# Patient Record
Sex: MALE | Race: WHITE | Employment: PART TIME | URBAN - METROPOLITAN AREA
[De-identification: names, ages, dates, MRNs, and addresses within clinical notes are randomized per-mention and may not be internally consistent; named-entity substitution may affect disease eponyms.]

---

## 2018-05-04 ENCOUNTER — APPOINTMENT (OUTPATIENT)
Dept: CT IMAGING | Age: 23
DRG: 199 | End: 2018-05-04
Attending: FAMILY MEDICINE
Payer: SELF-PAY

## 2018-05-04 ENCOUNTER — APPOINTMENT (OUTPATIENT)
Dept: CT IMAGING | Age: 23
DRG: 199 | End: 2018-05-04
Attending: EMERGENCY MEDICINE
Payer: SELF-PAY

## 2018-05-04 ENCOUNTER — APPOINTMENT (OUTPATIENT)
Dept: GENERAL RADIOLOGY | Age: 23
DRG: 199 | End: 2018-05-04
Attending: PHYSICIAN ASSISTANT
Payer: SELF-PAY

## 2018-05-04 ENCOUNTER — HOSPITAL ENCOUNTER (INPATIENT)
Age: 23
LOS: 3 days | Discharge: HOME | DRG: 199 | End: 2018-05-07
Attending: EMERGENCY MEDICINE | Admitting: FAMILY MEDICINE
Payer: SELF-PAY

## 2018-05-04 DIAGNOSIS — D72.829 LEUKOCYTOSIS, UNSPECIFIED TYPE: ICD-10-CM

## 2018-05-04 DIAGNOSIS — R07.89 ATYPICAL CHEST PAIN: Primary | ICD-10-CM

## 2018-05-04 DIAGNOSIS — F19.10 DRUG ABUSE (HCC): ICD-10-CM

## 2018-05-04 DIAGNOSIS — J98.2 PNEUMOMEDIASTINUM (HCC): ICD-10-CM

## 2018-05-04 PROBLEM — R07.9 ACUTE CHEST PAIN: Status: ACTIVE | Noted: 2018-05-04

## 2018-05-04 PROBLEM — F14.90 CRACK COCAINE USE: Status: ACTIVE | Noted: 2018-05-04

## 2018-05-04 PROBLEM — R06.02 SHORTNESS OF BREATH: Status: ACTIVE | Noted: 2018-05-04

## 2018-05-04 LAB
ABO + RH BLD: NORMAL
ALBUMIN SERPL-MCNC: 3.7 G/DL (ref 3.5–5)
ALBUMIN/GLOB SERPL: 0.9 {RATIO} (ref 1.1–2.2)
ALP SERPL-CCNC: 58 U/L (ref 45–117)
ALT SERPL-CCNC: 15 U/L (ref 12–78)
AMPHET UR QL SCN: NEGATIVE
ANION GAP SERPL CALC-SCNC: 6 MMOL/L (ref 5–15)
APPEARANCE UR: CLEAR
APTT PPP: 28.3 SEC (ref 22.1–32)
ARTERIAL PATENCY WRIST A: YES
AST SERPL-CCNC: 16 U/L (ref 15–37)
BACTERIA URNS QL MICRO: NEGATIVE /HPF
BARBITURATES UR QL SCN: NEGATIVE
BASE DEFICIT BLD-SCNC: 1 MMOL/L
BASOPHILS # BLD: 0 K/UL (ref 0–0.1)
BASOPHILS NFR BLD: 0 % (ref 0–1)
BDY SITE: ABNORMAL
BENZODIAZ UR QL: NEGATIVE
BILIRUB SERPL-MCNC: 1.5 MG/DL (ref 0.2–1)
BILIRUB UR QL: NEGATIVE
BLOOD GROUP ANTIBODIES SERPL: NORMAL
BUN SERPL-MCNC: 17 MG/DL (ref 6–20)
BUN/CREAT SERPL: 15 (ref 12–20)
CALCIUM SERPL-MCNC: 9.2 MG/DL (ref 8.5–10.1)
CANNABINOIDS UR QL SCN: NEGATIVE
CHLORIDE SERPL-SCNC: 105 MMOL/L (ref 97–108)
CO2 SERPL-SCNC: 28 MMOL/L (ref 21–32)
COCAINE UR QL SCN: POSITIVE
COLOR UR: ABNORMAL
CREAT SERPL-MCNC: 1.15 MG/DL (ref 0.7–1.3)
D DIMER PPP FEU-MCNC: 0.34 MG/L FEU (ref 0–0.65)
DIFFERENTIAL METHOD BLD: ABNORMAL
DRUG SCRN COMMENT,DRGCM: ABNORMAL
EOSINOPHIL # BLD: 0.1 K/UL (ref 0–0.4)
EOSINOPHIL NFR BLD: 0 % (ref 0–7)
EPITH CASTS URNS QL MICRO: ABNORMAL /LPF
ERYTHROCYTE [DISTWIDTH] IN BLOOD BY AUTOMATED COUNT: 14.7 % (ref 11.5–14.5)
GAS FLOW.O2 O2 DELIVERY SYS: ABNORMAL L/MIN
GLOBULIN SER CALC-MCNC: 4.2 G/DL (ref 2–4)
GLUCOSE SERPL-MCNC: 100 MG/DL (ref 65–100)
GLUCOSE UR STRIP.AUTO-MCNC: NEGATIVE MG/DL
HCO3 BLD-SCNC: 23.6 MMOL/L (ref 22–26)
HCT VFR BLD AUTO: 46 % (ref 36.6–50.3)
HGB BLD-MCNC: 15.2 G/DL (ref 12.1–17)
HGB UR QL STRIP: NEGATIVE
HYALINE CASTS URNS QL MICRO: ABNORMAL /LPF (ref 0–5)
IMM GRANULOCYTES # BLD: 0.1 K/UL (ref 0–0.04)
IMM GRANULOCYTES NFR BLD AUTO: 1 % (ref 0–0.5)
INR PPP: 1.2 (ref 0.9–1.1)
KETONES UR QL STRIP.AUTO: ABNORMAL MG/DL
LACTATE SERPL-SCNC: 0.5 MMOL/L (ref 0.4–2)
LEUKOCYTE ESTERASE UR QL STRIP.AUTO: NEGATIVE
LYMPHOCYTES # BLD: 1.9 K/UL (ref 0.8–3.5)
LYMPHOCYTES NFR BLD: 11 % (ref 12–49)
MCH RBC QN AUTO: 31.8 PG (ref 26–34)
MCHC RBC AUTO-ENTMCNC: 33 G/DL (ref 30–36.5)
MCV RBC AUTO: 96.2 FL (ref 80–99)
METHADONE UR QL: NEGATIVE
MONOCYTES # BLD: 1.1 K/UL (ref 0–1)
MONOCYTES NFR BLD: 6 % (ref 5–13)
NEUTS SEG # BLD: 14.5 K/UL (ref 1.8–8)
NEUTS SEG NFR BLD: 82 % (ref 32–75)
NITRITE UR QL STRIP.AUTO: NEGATIVE
NRBC # BLD: 0 K/UL (ref 0–0.01)
NRBC BLD-RTO: 0 PER 100 WBC
OPIATES UR QL: NEGATIVE
PCO2 BLD: 38.9 MMHG (ref 35–45)
PCP UR QL: NEGATIVE
PH BLD: 7.39 [PH] (ref 7.35–7.45)
PH UR STRIP: 6 [PH] (ref 5–8)
PLATELET # BLD AUTO: 206 K/UL (ref 150–400)
PMV BLD AUTO: 8.8 FL (ref 8.9–12.9)
PO2 BLD: 55 MMHG (ref 80–100)
POTASSIUM SERPL-SCNC: 4.3 MMOL/L (ref 3.5–5.1)
PROT SERPL-MCNC: 7.9 G/DL (ref 6.4–8.2)
PROT UR STRIP-MCNC: NEGATIVE MG/DL
PROTHROMBIN TIME: 12.1 SEC (ref 9–11.1)
RBC # BLD AUTO: 4.78 M/UL (ref 4.1–5.7)
RBC #/AREA URNS HPF: ABNORMAL /HPF (ref 0–5)
SAO2 % BLD: 88 % (ref 92–97)
SODIUM SERPL-SCNC: 139 MMOL/L (ref 136–145)
SP GR UR REFRACTOMETRY: 1.01 (ref 1–1.03)
SPECIMEN EXP DATE BLD: NORMAL
SPECIMEN TYPE: ABNORMAL
THERAPEUTIC RANGE,PTTT: NORMAL SECS (ref 58–77)
TROPONIN I SERPL-MCNC: <0.04 NG/ML
UA: UC IF INDICATED,UAUC: ABNORMAL
UROBILINOGEN UR QL STRIP.AUTO: 1 EU/DL (ref 0.2–1)
WBC # BLD AUTO: 17.8 K/UL (ref 4.1–11.1)
WBC URNS QL MICRO: ABNORMAL /HPF (ref 0–4)

## 2018-05-04 PROCEDURE — 74011250636 HC RX REV CODE- 250/636: Performed by: NURSE PRACTITIONER

## 2018-05-04 PROCEDURE — 71275 CT ANGIOGRAPHY CHEST: CPT

## 2018-05-04 PROCEDURE — 80307 DRUG TEST PRSMV CHEM ANLYZR: CPT | Performed by: FAMILY MEDICINE

## 2018-05-04 PROCEDURE — 86900 BLOOD TYPING SEROLOGIC ABO: CPT | Performed by: FAMILY MEDICINE

## 2018-05-04 PROCEDURE — 83605 ASSAY OF LACTIC ACID: CPT | Performed by: FAMILY MEDICINE

## 2018-05-04 PROCEDURE — 94762 N-INVAS EAR/PLS OXIMTRY CONT: CPT

## 2018-05-04 PROCEDURE — 93005 ELECTROCARDIOGRAM TRACING: CPT

## 2018-05-04 PROCEDURE — 74011250636 HC RX REV CODE- 250/636: Performed by: FAMILY MEDICINE

## 2018-05-04 PROCEDURE — 65660000000 HC RM CCU STEPDOWN

## 2018-05-04 PROCEDURE — 80053 COMPREHEN METABOLIC PANEL: CPT | Performed by: PHYSICIAN ASSISTANT

## 2018-05-04 PROCEDURE — 81001 URINALYSIS AUTO W/SCOPE: CPT | Performed by: FAMILY MEDICINE

## 2018-05-04 PROCEDURE — 84484 ASSAY OF TROPONIN QUANT: CPT | Performed by: PHYSICIAN ASSISTANT

## 2018-05-04 PROCEDURE — 82803 BLOOD GASES ANY COMBINATION: CPT

## 2018-05-04 PROCEDURE — 96360 HYDRATION IV INFUSION INIT: CPT

## 2018-05-04 PROCEDURE — 74011636320 HC RX REV CODE- 636/320: Performed by: EMERGENCY MEDICINE

## 2018-05-04 PROCEDURE — 99284 EMERGENCY DEPT VISIT MOD MDM: CPT

## 2018-05-04 PROCEDURE — 71046 X-RAY EXAM CHEST 2 VIEWS: CPT

## 2018-05-04 PROCEDURE — 85379 FIBRIN DEGRADATION QUANT: CPT | Performed by: FAMILY MEDICINE

## 2018-05-04 PROCEDURE — 85025 COMPLETE CBC W/AUTO DIFF WBC: CPT | Performed by: PHYSICIAN ASSISTANT

## 2018-05-04 PROCEDURE — 85730 THROMBOPLASTIN TIME PARTIAL: CPT | Performed by: FAMILY MEDICINE

## 2018-05-04 PROCEDURE — 74011000258 HC RX REV CODE- 258: Performed by: EMERGENCY MEDICINE

## 2018-05-04 PROCEDURE — 87040 BLOOD CULTURE FOR BACTERIA: CPT | Performed by: FAMILY MEDICINE

## 2018-05-04 PROCEDURE — 36415 COLL VENOUS BLD VENIPUNCTURE: CPT | Performed by: FAMILY MEDICINE

## 2018-05-04 PROCEDURE — 36600 WITHDRAWAL OF ARTERIAL BLOOD: CPT

## 2018-05-04 PROCEDURE — 85610 PROTHROMBIN TIME: CPT | Performed by: FAMILY MEDICINE

## 2018-05-04 PROCEDURE — 74011000258 HC RX REV CODE- 258: Performed by: FAMILY MEDICINE

## 2018-05-04 RX ORDER — SODIUM CHLORIDE 0.9 % (FLUSH) 0.9 %
5-10 SYRINGE (ML) INJECTION EVERY 8 HOURS
Status: DISCONTINUED | OUTPATIENT
Start: 2018-05-05 | End: 2018-05-07 | Stop reason: HOSPADM

## 2018-05-04 RX ORDER — SODIUM CHLORIDE 0.9 % (FLUSH) 0.9 %
5-10 SYRINGE (ML) INJECTION AS NEEDED
Status: DISCONTINUED | OUTPATIENT
Start: 2018-05-04 | End: 2018-05-07 | Stop reason: HOSPADM

## 2018-05-04 RX ORDER — SODIUM CHLORIDE 9 MG/ML
125 INJECTION, SOLUTION INTRAVENOUS CONTINUOUS
Status: DISCONTINUED | OUTPATIENT
Start: 2018-05-05 | End: 2018-05-07 | Stop reason: HOSPADM

## 2018-05-04 RX ORDER — LEVOFLOXACIN 5 MG/ML
750 INJECTION, SOLUTION INTRAVENOUS EVERY 24 HOURS
Status: DISCONTINUED | OUTPATIENT
Start: 2018-05-04 | End: 2018-05-07 | Stop reason: HOSPADM

## 2018-05-04 RX ORDER — SODIUM CHLORIDE 0.9 % (FLUSH) 0.9 %
10 SYRINGE (ML) INJECTION
Status: COMPLETED | OUTPATIENT
Start: 2018-05-04 | End: 2018-05-04

## 2018-05-04 RX ADMIN — SODIUM CHLORIDE 500 ML: 900 INJECTION, SOLUTION INTRAVENOUS at 19:10

## 2018-05-04 RX ADMIN — SODIUM CHLORIDE 100 ML: 900 INJECTION, SOLUTION INTRAVENOUS at 21:13

## 2018-05-04 RX ADMIN — SODIUM CHLORIDE 1000 ML: 900 INJECTION, SOLUTION INTRAVENOUS at 21:47

## 2018-05-04 RX ADMIN — SODIUM CHLORIDE 125 ML/HR: 900 INJECTION, SOLUTION INTRAVENOUS at 23:28

## 2018-05-04 RX ADMIN — Medication 10 ML: at 21:12

## 2018-05-04 RX ADMIN — Medication 10 ML: at 23:30

## 2018-05-04 RX ADMIN — LEVOFLOXACIN 750 MG: 5 INJECTION, SOLUTION INTRAVENOUS at 23:29

## 2018-05-04 RX ADMIN — IOPAMIDOL 100 ML: 755 INJECTION, SOLUTION INTRAVENOUS at 21:12

## 2018-05-04 NOTE — IP AVS SNAPSHOT
[facility] 
[phone] Patient: Isidro Floor MRN: [id number] VTV:2/63/6533 About your hospitalization You were admitted on: May 4, 2018 You last received care in the:  [address] [address] [facility] You were discharged on: May 7, 2018 Why you were hospitalized Your primary diagnosis was:  Pneumomediastinum (Hcc) Your diagnoses also included:  Shortness Of Breath, Crack Cocaine Use, Acute Chest Pain Follow-up Information Follow up With Details Comments Contact Info 403 [facility] In 1 week  222 [facility] [address] 
[phone] [address] [place] [address] EMERGENCY DEP  If symptoms worsen 611 [facility] [address] [phone] Jayde Other, MD  pcp of choice as needed  Patient can only remember the practice name and not the physician Discharge Orders None A check adriano indicates which time of day the medication should be taken. My Medications START taking these medications Instructions Each Dose to Equal  
 Morning Noon Evening Bedtime  
 levoFLOXacin 750 mg tablet Commonly known as:  Yue Lieu Your last dose was: Your next dose is: Take 1 Tab by mouth daily. 750 mg Where to Get Your Medications Information on where to get these meds will be given to you by the nurse or doctor. ! Ask your nurse or doctor about these medications  
  levoFLOXacin 750 mg tablet Discharge Instructions Cocaine Misuse: Care Instructions Your Care Instructions Using cocaine can cause physical and mental harm. It can increase your heart rate and blood pressure, which can lead to a heart attack and even death. It can raise your body temperature. You may have nausea, vomiting, and chills. If you smoke cocaine, the fumes can cause breathing problems. If you snort cocaine, it can damage your nasal passages. If you inject cocaine, it can cause an abscess at the injection site or an infection throughout your body. You may become shaky and restless. You also may see or hear things that are not there (hallucinations), or believe things that are not true. When the doctor treated you, he or she may have: · Watched your symptoms or done tests to find out how much cocaine was in your body. · Treated you to control your heart rate, temperature, and blood pressure. · Given you oxygen to help you breathe. · Given you medicine to settle your thoughts and help keep you calm. The doctor has checked you carefully, but problems can develop later. If you notice any problems or new symptoms, get medical treatment right away. How can you care for yourself at home? · When you use cocaine regularly, your body and brain get used to it. This is called dependency. If you are dependent on this drug, you may have withdrawal symptoms when you stop using it. You may feel drowsy, have vivid dreams, or feel hungry, tired, or depressed. You may also feel confused and have trouble thinking clearly. To help get past these symptoms: ¨ Get plenty of rest. 
¨ Drink lots of fluids. ¨ Stay active, but don't tire yourself. ¨ Eat a healthy diet. · Talk to your doctor about drug counseling programs that can help you stop using cocaine. · When you stop using cocaine, you may develop abstinence syndrome, which can last for months. Symptoms of this may include feeling depressed, being tired, having trouble concentrating, and craving cocaine. When should you call for help? Call 911 anytime you think you may need emergency care. For example, call if: 
? · You have symptoms of a heart attack. These may include: ¨ Chest pain or pressure, or a strange feeling in the chest. 
¨ Sweating. ¨ Shortness of breath. ¨ Nausea or vomiting. ¨ Pain, pressure, or a strange feeling in the back, neck, jaw, or upper belly or in one or both shoulders or arms. ¨ A fast or irregular heartbeat. After you call 911, the  may tell you to chew 1 adult-strength or 2 to 4 low-dose aspirin. Wait for an ambulance. Do not try to drive yourself. ? · You feel you cannot stop from hurting yourself or someone else. ?Call your doctor now or seek immediate medical care if: 
? · You have severe side effects from using cocaine. These may include problems with thinking, such as seeing things that aren't there or thinking that someone is trying to harm you (paranoia). ? · You have new or worse withdrawal symptoms. ? Watch closely for changes in your health, and be sure to contact your doctor if: 
? · You need more help or support to stop. Where can you learn more? Go to http://paulMoneyReefjigna.info/. Enter N083 in the search box to learn more about \"Cocaine Misuse: Care Instructions. \" Current as of: November 3, 2016 Content Version: 11.4 © 9823-8661 [employer]. Care instructions adapted under license by [employer] (which disclaims liability or warranty for this information). If you have questions about a medical condition or this instruction, always ask your healthcare professional. [place] [address] any warranty or liability for your use of this information. Discharge Instructions PATIENT ID: Pascual Vallecillo MRN: [id number] YOB: 1995 DATE OF ADMISSION: 5/4/2018  6:32 PM   
DATE OF DISCHARGE: 5/7/2018 PRIMARY CARE PROVIDER: Jayde So MD  
 
ATTENDING PHYSICIAN: Matthias Butler MD 
DISCHARGING PROVIDER: Matthias Butler MD   
To contact this individual call [phone] and ask the  to page. If unavailable ask to be transferred the Adult Hospitalist Department. DISCHARGE DIAGNOSES pneumomediastinum 2n2 barotrauma 2n2 cocain use CONSULTATIONS: IP CONSULT TO HOSPITALIST 
IP CONSULT TO THORACIC SURGERY 
 
PROCEDURES/SURGERIES: * No surgery found * PENDING TEST RESULTS:  
At the time of discharge the following test results are still pending: na 
 
FOLLOW UP APPOINTMENTS:  
Follow-up Information Follow up With Details Comments Contact Info [address] In 1 week  222 Bhumika Gomez [address] [phone] [facility] EMERGENCY DEP  If symptoms worsen [address] [place] [facility] [address] 
[phone] Jayde So MD  pcp of choice as needed  Patient can only remember the practice name and not the physician ADDITIONAL CARE RECOMMENDATIONS: na 
 
DIET: Resume previous diet ACTIVITY: Activity as tolerated WOUND CARE: na 
 
EQUIPMENT needed: na 
 
 
  
 SNF/Inpatient Rehab/LTAC  
x Independent/assisted living Hospice Other:  
 
   
 
Signed:  
Lynda Root MD 
5/7/2018 
8:16 AM 
 
 
  
  
  
Homesnap Announcement We are excited to announce that we are making your provider's discharge notes available to you in Homesnap.   You will see these notes when they are completed and signed by the physician that discharged you from your recent hospital stay. If you have any questions or concerns about any information you see in Healthkart, please call the Health Information Department where you were seen or reach out to your Primary Care Provider for more information about your plan of care. Introducing hospitals & HEALTH SERVICES! [facility] introduces Healthkart patient portal. Now you can access parts of your medical record, email your doctor's office, and request medication refills online. 1. In your internet browser, go to [email or web address] 2. Click on the First Time User? Click Here link in the Sign In box. You will see the New Member Sign Up page. 3. Enter your Healthkart Access Code exactly as it appears below. You will not need to use this code after youve completed the sign-up process. If you do not sign up before the expiration date, you must request a new code. · Healthkart Access Code: [email or web address] Expires: 8/2/2018  6:12 PM 
 
4. Enter the last four digits of your Social Security Number (xxxx) and Date of Birth (mm/dd/yyyy) as indicated and click Submit. You will be taken to the next sign-up page. 5. Create a Healthkart ID. This will be your Healthkart login ID and cannot be changed, so think of one that is secure and easy to remember. 6. Create a Healthkart password. You can change your password at any time. 7. Enter your Password Reset Question and Answer. This can be used at a later time if you forget your password. 8. Enter your e-mail address. You will receive e-mail notification when new information is available in [address] 9. Click Sign Up. You can now view and download portions of your medical record. 10. Click the Download Summary menu link to download a portable copy of your medical information. If you have questions, please visit the Frequently Asked Questions section of the Healthkart website. Remember, Healthkart is NOT to be used for urgent needs. For medical emergencies, dial 911. Now available from your iPhone and Android! Introducing Shiv Miles As a [facility] patient, I wanted to make you aware of our electronic visit tool called Shiv Miles. CardStar 24/7 allows you to connect within minutes with a medical provider 24 hours a day, seven days a week via a mobile device or tablet or logging into a secure website from your computer. You can access Shiv Miles from anywhere in the [place]. A virtual visit might be right for you when you have a simple condition and feel like you just dont want to get out of bed, or cant get away from work for an appointment, when your regular [facility] provider is not available (evenings, weekends or holidays), or when youre out of town and need minor care. Electronic visits cost only $49 and if the Quosis/7 provider determines a prescription is needed to treat your condition, one can be electronically transmitted to a nearby pharmacy*. Please take a moment to enroll today if you have not already done so. The enrollment process is free and takes just a few minutes. To enroll, please download the CardStar 24/Secrette kamari to your tablet or phone, or visit [email or web address] to enroll on your computer. And, as an [address] [place] [address] patient with a [email or web address] account, the results of your visits will be scanned into your electronic medical record and your primary care provider will be able to view the scanned results. We urge you to continue to see your regular [facility] provider for your ongoing medical care. And while your primary care provider may not be the one available when you seek a Shiv Miles virtual visit, the peace of mind you get from getting a real diagnosis real time can be priceless. For more information on Shiv Miles, view our Frequently Asked Questions (FAQs) at [email or web address]. Sincerely, 
 
Deepak Harvey MD 
Chief Medical Officer [facility] Lillie Simmons *:  certain medications cannot be prescribed via Shiv Miles Unresulted Labs-Please follow up with your PCP about these lab tests Order Current Status CULTURE, BLOOD Preliminary result CULTURE, BLOOD Preliminary result Providers Seen During Your Hospitalization Provider Specialty Primary office phone Lucia Long MD Emergency Medicine [phone] Kami Carmona MD Family Practice [phone] Hamlet Cornell MD Internal Medicine [phone] Your Primary Care Physician (PCP) Primary Care Physician Office Phone Office Fax OTHER, PHYS ** None ** ** None ** You are allergic to the following No active allergies Recent Documentation Height Weight BMI Smoking Status 1.803 m 70.3 kg 21.62 kg/m2 Current Every Day Smoker Emergency Contacts Name Discharge Info Relation Home Work Mobile Krishan Peguero YES [1] Father [15] [phone] Patient Belongings The following personal items are in your possession at time of discharge: 
  Dental Appliances: None  Visual Aid: None      Home Medications: None   Jewelry: None  Clothing: Pants, Socks, Shirt    Other Valuables: None Please provide this summary of care documentation to your next provider. Signatures-by signing, you are acknowledging that this After Visit Summary has been reviewed with you and you have received a copy. Patient Signature:  ____________________________________________________________ Date:  ____________________________________________________________  
  
[place] Provider Signature:  ____________________________________________________________ Date:  ____________________________________________________________

## 2018-05-04 NOTE — ED NOTES
6:11 PM  I have evaluated the patient as the Provider in Triage. I have reviewed His vital signs and the triage nurse assessment. I have talked with the patient and any available family and advised that I am the provider in triage and have ordered the appropriate study to initiate their work up based on the clinical presentation during my assessment. I have advised that the patient will be accommodated in the Main ED as soon as possible. I have also requested to contact the triage nurse or myself immediately if the patient experiences any changes in their condition during this brief waiting period. Smoked crack cocaine last night. CP and SOB today.     PREMA Schwartz

## 2018-05-04 NOTE — IP AVS SNAPSHOT
Summary of Care Report The Summary of Care report has been created to help improve care coordination. Users with access to Popular Pays or [address] (Web-based application) may access additional patient information including the Discharge Summary. If you are not currently a [address] user and need more information, please call the number listed below in the [place] [address] section and ask to be connected with Medical Records. Facility Information Name Address Phone [address] [place] [phone] Patient Information Patient Name Sex JUVENCIO Land ([id number]) Male 1995 Discharge Information Admitting Provider Service Area Unit Leonora Fregoso MD / [address] [place] [phone] Discharge Provider Discharge Date/Time Discharge Disposition Destination (none) 2018 (Pending) AHR (none) Patient Language Language ENGLISH [13] Hospital Problems as of 2018  Reviewed: 2018  8:12 AM by Nicolle Stanton MD  
 None Non-Hospital Problems as of 2018  Reviewed: 2018  8:12 AM by Nicolle Stanton MD  
 None You are allergic to the following No active allergies Current Discharge Medication List  
  
START taking these medications Dose & Instructions Dispensing Information Comments  
 levoFLOXacin 750 mg tablet Commonly known as:  Maliha Hernandez Dose:  750 mg Take 1 Tab by mouth daily. Quantity:  3 Tab Refills:  0 Follow-up Information Follow up With Details Comments Contact Info [address] [place] [address] In 1 week  222 [facility] [address] [phone] [facility] EMERGENCY DEP  If symptoms worsen 611 [facility] [address] [phone] Phys Other, MD  pcp of choice as needed  Patient can only remember the practice name and not the physician Discharge Instructions Cocaine Misuse: Care Instructions Your Care Instructions Using cocaine can cause physical and mental harm. It can increase your heart rate and blood pressure, which can lead to a heart attack and even death. It can raise your body temperature. You may have nausea, vomiting, and chills. If you smoke cocaine, the fumes can cause breathing problems. If you snort cocaine, it can damage your nasal passages. If you inject cocaine, it can cause an abscess at the injection site or an infection throughout your body. You may become shaky and restless. You also may see or hear things that are not there (hallucinations), or believe things that are not true. When the doctor treated you, he or she may have: · Watched your symptoms or done tests to find out how much cocaine was in your body. · Treated you to control your heart rate, temperature, and blood pressure. · Given you oxygen to help you breathe. · Given you medicine to settle your thoughts and help keep you calm. The doctor has checked you carefully, but problems can develop later. If you notice any problems or new symptoms, get medical treatment right away. How can you care for yourself at home? · When you use cocaine regularly, your body and brain get used to it. This is called dependency. If you are dependent on this drug, you may have withdrawal symptoms when you stop using it. You may feel drowsy, have vivid dreams, or feel hungry, tired, or depressed. You may also feel confused and have trouble thinking clearly. To help get past these symptoms: ¨ Get plenty of rest. 
¨ Drink lots of fluids. ¨ Stay active, but don't tire yourself. ¨ Eat a healthy diet. · Talk to your doctor about drug counseling programs that can help you stop using cocaine. · When you stop using cocaine, you may develop abstinence syndrome, which can last for months. Symptoms of this may include feeling depressed, being tired, having trouble concentrating, and craving cocaine. When should you call for help? Call 911 anytime you think you may need emergency care. For example, call if: 
? · You have symptoms of a heart attack. These may include: ¨ Chest pain or pressure, or a strange feeling in the chest. 
¨ Sweating. ¨ Shortness of breath. ¨ Nausea or vomiting. ¨ Pain, pressure, or a strange feeling in the back, neck, jaw, or upper belly or in one or both shoulders or arms. ¨ A fast or irregular heartbeat. After you call 911, the  may tell you to chew 1 adult-strength or 2 to 4 low-dose aspirin. Wait for an ambulance. Do not try to drive yourself. ? · You feel you cannot stop from hurting yourself or someone else. ?Call your doctor now or seek immediate medical care if: 
? · You have severe side effects from using cocaine. These may include problems with thinking, such as seeing things that aren't there or thinking that someone is trying to harm you (paranoia). ? · You have new or worse withdrawal symptoms. ? Watch closely for changes in your health, and be sure to contact your doctor if: 
? · You need more help or support to stop. Where can you learn more? Go to http://paul-jigna.info/. Enter F134 in the search box to learn more about \"Cocaine Misuse: Care Instructions. \" Current as of: November 3, 2016 Content Version: 11.4 © 4441-4547 [employer]. Care instructions adapted under license by t[employer]ap (which disclaims liability or warranty for this information). If you have questions about a medical condition or this instruction, always ask your healthcare professional. [address] any warranty or liability for your use of this information. Discharge Instructions PATIENT ID: Srinivas Carmona MRN: [id number] YOB: 1995 DATE OF ADMISSION: 5/4/2018  6:32 PM   
DATE OF DISCHARGE: 5/7/2018 PRIMARY CARE PROVIDER: Jayde So MD  
 
ATTENDING PHYSICIAN: Judy Vargas MD 
DISCHARGING PROVIDER: Judy Vargas MD   
To contact this individual call [phone] and ask the  to page. If unavailable ask to be transferred the Adult Hospitalist Department. DISCHARGE DIAGNOSES pneumomediastinum 2n2 barotrauma 2n2 cocain use CONSULTATIONS: IP CONSULT TO HOSPITALIST 
IP CONSULT TO THORACIC SURGERY 
 
PROCEDURES/SURGERIES: * No surgery found * PENDING TEST RESULTS:  
At the time of discharge the following test results are still pending: na 
 
FOLLOW UP APPOINTMENTS:  
Follow-up Information Follow up With Details Comments Contact Info 403 [facility] In 1 week  222 [facility] [address] [phone] [facility] EMERGENCY DEP  If symptoms worsen 611 [facility] [address] 
[phone] Jayde So MD  pcp of choice as needed  Patient can only remember the practice name and not the physician ADDITIONAL CARE RECOMMENDATIONS: na 
 
DIET: Resume previous diet ACTIVITY: Activity as tolerated WOUND CARE: na 
 
EQUIPMENT needed: na 
 
 
  
 SNF/Inpatient Rehab/LTAC  
x Independent/assisted living Hospice Other:  
 
   
 
Signed:  
Gretchen Nance MD 
5/7/2018 
8:16 AM 
 
 
Chart Review Routing History No Routing History on File

## 2018-05-04 NOTE — IP AVS SNAPSHOT
110 [facility] [place] [address] [place] [address] 
[phone] Patient: Walker Wells MRN: [id number] QTC:3/47/5882 A check adriano indicates which time of day the medication should be taken. My Medications START taking these medications Instructions Each Dose to Equal  
 Morning Noon Evening Bedtime  
 levoFLOXacin 750 mg tablet Commonly known as:  Lexie Barahona Your last dose was: Your next dose is: Take 1 Tab by mouth daily. 750 mg Where to Get Your Medications Information on where to get these meds will be given to you by the nurse or doctor. ! Ask your nurse or doctor about these medications  
  levoFLOXacin 750 mg tablet

## 2018-05-04 NOTE — ED TRIAGE NOTES
CP and SOB onset after smoking crack cocaine last night. Pain worse with movement. Pt reports pain with deep breath and \"I've been sweating\".

## 2018-05-05 LAB
ALBUMIN SERPL-MCNC: 3.2 G/DL (ref 3.5–5)
ALBUMIN/GLOB SERPL: 0.8 {RATIO} (ref 1.1–2.2)
ALP SERPL-CCNC: 50 U/L (ref 45–117)
ALT SERPL-CCNC: 13 U/L (ref 12–78)
ANION GAP SERPL CALC-SCNC: 7 MMOL/L (ref 5–15)
AST SERPL-CCNC: 15 U/L (ref 15–37)
ATRIAL RATE: 100 BPM
BASOPHILS # BLD: 0 K/UL (ref 0–0.1)
BASOPHILS NFR BLD: 0 % (ref 0–1)
BILIRUB DIRECT SERPL-MCNC: 0.3 MG/DL (ref 0–0.2)
BILIRUB SERPL-MCNC: 1.2 MG/DL (ref 0.2–1)
BUN SERPL-MCNC: 16 MG/DL (ref 6–20)
BUN/CREAT SERPL: 17 (ref 12–20)
CALCIUM SERPL-MCNC: 8.8 MG/DL (ref 8.5–10.1)
CALCULATED P AXIS, ECG09: 73 DEGREES
CALCULATED R AXIS, ECG10: 61 DEGREES
CALCULATED T AXIS, ECG11: 22 DEGREES
CHLORIDE SERPL-SCNC: 106 MMOL/L (ref 97–108)
CHOLEST SERPL-MCNC: 119 MG/DL
CO2 SERPL-SCNC: 26 MMOL/L (ref 21–32)
CREAT SERPL-MCNC: 0.94 MG/DL (ref 0.7–1.3)
DIAGNOSIS, 93000: NORMAL
DIFFERENTIAL METHOD BLD: ABNORMAL
EOSINOPHIL # BLD: 0.1 K/UL (ref 0–0.4)
EOSINOPHIL NFR BLD: 1 % (ref 0–7)
ERYTHROCYTE [DISTWIDTH] IN BLOOD BY AUTOMATED COUNT: 14.6 % (ref 11.5–14.5)
GLOBULIN SER CALC-MCNC: 3.8 G/DL (ref 2–4)
GLUCOSE SERPL-MCNC: 95 MG/DL (ref 65–100)
HCT VFR BLD AUTO: 42.5 % (ref 36.6–50.3)
HDLC SERPL-MCNC: 73 MG/DL
HDLC SERPL: 1.6 {RATIO} (ref 0–5)
HGB BLD-MCNC: 13.7 G/DL (ref 12.1–17)
IMM GRANULOCYTES # BLD: 0.1 K/UL (ref 0–0.04)
IMM GRANULOCYTES NFR BLD AUTO: 0 % (ref 0–0.5)
LACTATE SERPL-SCNC: 0.8 MMOL/L (ref 0.4–2)
LDLC SERPL CALC-MCNC: 32 MG/DL (ref 0–100)
LIPID PROFILE,FLP: NORMAL
LYMPHOCYTES # BLD: 2.1 K/UL (ref 0.8–3.5)
LYMPHOCYTES NFR BLD: 15 % (ref 12–49)
MCH RBC QN AUTO: 31.1 PG (ref 26–34)
MCHC RBC AUTO-ENTMCNC: 32.2 G/DL (ref 30–36.5)
MCV RBC AUTO: 96.6 FL (ref 80–99)
MONOCYTES # BLD: 0.9 K/UL (ref 0–1)
MONOCYTES NFR BLD: 7 % (ref 5–13)
NEUTS SEG # BLD: 10.6 K/UL (ref 1.8–8)
NEUTS SEG NFR BLD: 77 % (ref 32–75)
NRBC # BLD: 0 K/UL (ref 0–0.01)
NRBC BLD-RTO: 0 PER 100 WBC
P-R INTERVAL, ECG05: 126 MS
PLATELET # BLD AUTO: 194 K/UL (ref 150–400)
PMV BLD AUTO: 9.2 FL (ref 8.9–12.9)
POTASSIUM SERPL-SCNC: 3.9 MMOL/L (ref 3.5–5.1)
PROT SERPL-MCNC: 7 G/DL (ref 6.4–8.2)
Q-T INTERVAL, ECG07: 334 MS
QRS DURATION, ECG06: 88 MS
QTC CALCULATION (BEZET), ECG08: 430 MS
RBC # BLD AUTO: 4.4 M/UL (ref 4.1–5.7)
SODIUM SERPL-SCNC: 139 MMOL/L (ref 136–145)
TRIGL SERPL-MCNC: 70 MG/DL (ref ?–150)
VENTRICULAR RATE, ECG03: 100 BPM
VLDLC SERPL CALC-MCNC: 14 MG/DL
WBC # BLD AUTO: 13.9 K/UL (ref 4.1–11.1)

## 2018-05-05 PROCEDURE — 74011250637 HC RX REV CODE- 250/637: Performed by: FAMILY MEDICINE

## 2018-05-05 PROCEDURE — 80053 COMPREHEN METABOLIC PANEL: CPT | Performed by: FAMILY MEDICINE

## 2018-05-05 PROCEDURE — 74011000258 HC RX REV CODE- 258: Performed by: FAMILY MEDICINE

## 2018-05-05 PROCEDURE — 36415 COLL VENOUS BLD VENIPUNCTURE: CPT | Performed by: FAMILY MEDICINE

## 2018-05-05 PROCEDURE — 82248 BILIRUBIN DIRECT: CPT | Performed by: FAMILY MEDICINE

## 2018-05-05 PROCEDURE — 80061 LIPID PANEL: CPT | Performed by: FAMILY MEDICINE

## 2018-05-05 PROCEDURE — 85025 COMPLETE CBC W/AUTO DIFF WBC: CPT | Performed by: FAMILY MEDICINE

## 2018-05-05 PROCEDURE — 83605 ASSAY OF LACTIC ACID: CPT | Performed by: FAMILY MEDICINE

## 2018-05-05 PROCEDURE — 74011250636 HC RX REV CODE- 250/636: Performed by: FAMILY MEDICINE

## 2018-05-05 PROCEDURE — 65660000000 HC RM CCU STEPDOWN

## 2018-05-05 RX ORDER — ACETAMINOPHEN 325 MG/1
650 TABLET ORAL
Status: DISCONTINUED | OUTPATIENT
Start: 2018-05-05 | End: 2018-05-07 | Stop reason: HOSPADM

## 2018-05-05 RX ORDER — IBUPROFEN 200 MG
1 TABLET ORAL DAILY
Status: DISCONTINUED | OUTPATIENT
Start: 2018-05-05 | End: 2018-05-05

## 2018-05-05 RX ORDER — VANCOMYCIN 1.75 GRAM/500 ML IN 0.9 % SODIUM CHLORIDE INTRAVENOUS
1750 ONCE
Status: COMPLETED | OUTPATIENT
Start: 2018-05-05 | End: 2018-05-05

## 2018-05-05 RX ADMIN — LEVOFLOXACIN 750 MG: 5 INJECTION, SOLUTION INTRAVENOUS at 21:24

## 2018-05-05 RX ADMIN — VANCOMYCIN HYDROCHLORIDE 1000 MG: 1 INJECTION, POWDER, LYOPHILIZED, FOR SOLUTION INTRAVENOUS at 21:23

## 2018-05-05 RX ADMIN — VANCOMYCIN HYDROCHLORIDE 1750 MG: 10 INJECTION, POWDER, LYOPHILIZED, FOR SOLUTION INTRAVENOUS at 03:44

## 2018-05-05 RX ADMIN — PIPERACILLIN AND TAZOBACTAM 3.38 G: 3; .375 INJECTION, POWDER, FOR SOLUTION INTRAVENOUS at 09:32

## 2018-05-05 RX ADMIN — VANCOMYCIN HYDROCHLORIDE 750 MG: 750 INJECTION, POWDER, LYOPHILIZED, FOR SOLUTION INTRAVENOUS at 13:23

## 2018-05-05 RX ADMIN — Medication 10 ML: at 21:24

## 2018-05-05 RX ADMIN — Medication 10 ML: at 13:23

## 2018-05-05 RX ADMIN — PIPERACILLIN AND TAZOBACTAM 3.38 G: 3; .375 INJECTION, POWDER, FOR SOLUTION INTRAVENOUS at 01:21

## 2018-05-05 RX ADMIN — PIPERACILLIN AND TAZOBACTAM 3.38 G: 3; .375 INJECTION, POWDER, FOR SOLUTION INTRAVENOUS at 18:49

## 2018-05-05 RX ADMIN — ACETAMINOPHEN 650 MG: 325 TABLET, FILM COATED ORAL at 04:42

## 2018-05-05 NOTE — ED NOTES
TRANSFER - OUT REPORT:    Verbal report given to [address], RN(name) on Bonifacio Wall  being transferred to [facility] (unit) for routine progression of care       Report consisted of patients Situation, Background, Assessment and   Recommendations(SBAR). Information from the following report(s) SBAR, Kardex, ED Summary, Intake/Output, MAR, Recent Results and Cardiac Rhythm NSR was reviewed with the receiving nurse. Lines:   Peripheral IV 05/04/18 Right Antecubital (Active)   Site Assessment Clean, dry, & intact 5/4/2018  6:30 PM   Phlebitis Assessment 0 5/4/2018  6:30 PM   Dressing Status Clean, dry, & intact 5/4/2018  6:30 PM   Dressing Type Tape;Transparent 5/4/2018  6:30 PM   Hub Color/Line Status Pink;Capped;Flushed;Patent 5/4/2018  6:30 PM   Action Taken Blood drawn 5/4/2018  6:30 PM        Opportunity for questions and clarification was provided.

## 2018-05-05 NOTE — PROGRESS NOTES
Day #1 of Vancomycin  Indication:  SIRS  Current regimen:  vancomycin 750mg IV every 8  hours  Abx regimen:  vanc + levofloxacin + zosyn  ID Following ?: NO  Concomitant nephrotoxic drugs (requires more frequent monitoring): Contrast agents (received  @ ). Frequency of BMP?: daily (thru )    Recent Labs      18   0400  18   1830   WBC  13.9*  17.8*   CREA  0.94  1.15   BUN  16  17     Est CrCl: >120 ml/min; UO: --- ml/kg/hr  Temp (24hrs), Av.3 °F (36.8 °C), Min:97.2 °F (36.2 °C), Max:99.1 °F (37.3 °C)    Cultures:    - blood x 2 - NGTD - prelim   - MRSA screen - pending    Goal trough = 15 - 20 mcg/mL    Recent trough history (date/time/level/dose/action taken):  N/A    Plan: Will empirically Change to vancomycin 1000mg every 8 hours.

## 2018-05-05 NOTE — ROUTINE PROCESS
TRANSFER - IN REPORT:    Verbal report received from [address] [place] [address] (name) on Bonifacio [place]  being received from ED(unit) for routine progression of care      Report consisted of patients Situation, Background, Assessment and   Recommendations(SBAR). Information from the following report(s) SBAR, Recent Results and Cardiac Rhythm NSR was reviewed with the receiving nurse. Opportunity for questions and clarification was provided. Assessment completed upon patients arrival to unit and care assumed.          Primary Nurse Amisha Vasquez RN and Vinod Camejo RN performed a dual skin assessment on this patient No impairment noted  Wally score is 22

## 2018-05-05 NOTE — PROGRESS NOTES
Hospitalist Progress Note  Roberta Morgan MD  Answering service: [phone] from in house phone         Date of Service:  2018  NAME:  Crystal Ruth  :  1995  MRN:  [id number]      Admission Summary:   HISTORY OF PRESENT ILLNESS:  This is a 42-year-old white male with past medical history of crack cocaine abuse, presented to the Emergency Department from home with chief complaint of chest pain and shortness of breath. Patient notes he had smoked crack cocaine all last night and he had acute onset of chest pain located in the substernal region, severe, constant, aggravated with deep breathing, without specific alleviating factors. Dull aching. According to ER records, patient had a sense of impending doom while smoking crack cocaine. He had reportedly felt like he was going to die. At that time the patient had not sought any medical attention. Subsequently continued to have chest pain along with shortness of breath and palpitations. Finally came to the Emergency Department 2018 to the ED for evaluation. Interval history / Subjective:       2018   This note is a follow up note for multiple medical issues as listed below and partially expanded on herewith. Had pain on swallowing yesterday pm not this am with his liquid meals, will cont liquids and advacne in am if no contraindicaton at this time. See CT surg note on this. Assessment & Plan:     Pneumomediastinum; improved subjectively, see CT surg note, pt was using recreational drugs ( cocain) which known to cause this condition. Pt is better advacning diet in am  Anticipated    Tobacco abuse, discussed,     cocain abuse , discussed.     Code status: full   DVT prophylaxis: SCD;s     Care Plan discussed with: Patient/Family and Nurse  Disposition: Home w/Family and TBD     Hospital Problems  Date Reviewed: 2018          Codes Class Noted POA Shortness of breath ICD-10-CM: R06.02  ICD-9-CM: 786.05  5/4/2018 Unknown        * (Principal)Pneumomediastinum (HCC) ICD-10-CM: J98.2  ICD-9-CM: 518.1  5/4/2018 Unknown        Crack cocaine use ICD-10-CM: F14.90  ICD-9-CM: 305.60  5/4/2018 Unknown        Acute chest pain ICD-10-CM: R07.9  ICD-9-CM: 786.50  5/4/2018 Unknown                Review of Systems:   A comprehensive review of systems was negative except for that written in the HPI. Cp better, no n/v, dysphagia better. Vital Signs:    Last 24hrs VS reviewed since prior progress note. Most recent are:  Visit Vitals    /73 (BP 1 Location: Left arm, BP Patient Position: At rest)    Pulse 91    Temp 97.2 °F (36.2 °C)    Resp 19    Ht 5' 11\" (1.803 m)    Wt 70.1 kg (154 lb 8.7 oz)    SpO2 90%    BMI 21.55 kg/m2         Intake/Output Summary (Last 24 hours) at 05/05/18 1243  Last data filed at 05/05/18 0859   Gross per 24 hour   Intake           716.67 ml   Output              875 ml   Net          -158.33 ml        Physical Examination:             Constitutional:  No acute distress, cooperative, pleasant    ENT:  Oral mucous moist, oropharynx benign. Neck supple,    Resp:  CTA bilaterally. No wheezing/rhonchi/rales. No accessory muscle use   CV:  Regular rhythm, normal rate, no murmurs, gallops, rubs    GI:  Soft, non distended, non tender. normoactive bowel sounds, no hepatosplenomegaly     Musculoskeletal:  No edema, warm, 2+ pulses throughout    Neurologic:  Moves all extremities. AAOx3, CN II-XII reviewed     Psych:  Good insight, Not anxious nor agitated.   Skin:  Good turgor, no rashes or ulcers       Data Review:    Review and/or order of clinical lab test      Labs:     Recent Labs      05/05/18   0400  05/04/18   1830   WBC  13.9*  17.8*   HGB  13.7  15.2   HCT  42.5  46.0   PLT  194  206     Recent Labs      05/05/18   0400  05/04/18   1830   NA  139  139   K  3.9  4.3   CL  106  105   CO2  26  28   BUN  16  17   CREA  0.94  1.15 GLU  95  100   CA  8.8  9.2     Recent Labs      05/05/18   0400  05/04/18   1830   SGOT  15  16   ALT  13  15   AP  50  58   TBILI  1.2*  1.5*   TP  7.0  7.9   ALB  3.2*  3.7   GLOB  3.8  4.2*     Recent Labs      05/04/18   2142   INR  1.2*   PTP  12.1*   APTT  28.3      No results for input(s): FE, TIBC, PSAT, FERR in the last 72 hours. No results found for: FOL, RBCF   No results for input(s): PH, PCO2, PO2 in the last 72 hours.   Recent Labs      05/04/18   1830   TROIQ  <0.04     Lab Results   Component Value Date/Time    Cholesterol, total 119 05/05/2018 04:00 AM    HDL Cholesterol 73 05/05/2018 04:00 AM    LDL, calculated 32 05/05/2018 04:00 AM    Triglyceride 70 05/05/2018 04:00 AM    CHOL/HDL Ratio 1.6 05/05/2018 04:00 AM     No results found for: [facility]  Lab Results   Component Value Date/Time    Color YELLOW/STRAW 05/04/2018 10:25 PM    Appearance CLEAR 05/04/2018 10:25 PM    Specific gravity 1.010 05/04/2018 10:25 PM    pH (UA) 6.0 05/04/2018 10:25 PM    Protein NEGATIVE  05/04/2018 10:25 PM    Glucose NEGATIVE  05/04/2018 10:25 PM    Ketone TRACE (A) 05/04/2018 10:25 PM    Bilirubin NEGATIVE  05/04/2018 10:25 PM    Urobilinogen 1.0 05/04/2018 10:25 PM    Nitrites NEGATIVE  05/04/2018 10:25 PM    Leukocyte Esterase NEGATIVE  05/04/2018 10:25 PM    Epithelial cells FEW 05/04/2018 10:25 PM    Bacteria NEGATIVE  05/04/2018 10:25 PM    WBC 0-4 05/04/2018 10:25 PM    RBC 0-5 05/04/2018 10:25 PM         Medications Reviewed:     Current Facility-Administered Medications   Medication Dose Route Frequency    Vancomycin Pharmacy Dosing   Other Rx Dosing/Monitoring    vancomycin (VANCOCIN) 750 mg in 0.9% sodium chloride (MBP/ADV) 250 mL  750 mg IntraVENous Q8H    acetaminophen (TYLENOL) tablet 650 mg  650 mg Oral Q6H PRN    sodium chloride (NS) flush 5-10 mL  5-10 mL IntraVENous Q8H    sodium chloride (NS) flush 5-10 mL  5-10 mL IntraVENous PRN    sodium chloride (NS) flush 5-10 mL  5-10 mL IntraVENous PRN    piperacillin-tazobactam (ZOSYN) 3.375 g in 0.9% sodium chloride (MBP/ADV) 100 mL  3.375 g IntraVENous Q8H    levoFLOXacin (LEVAQUIN) 750 mg in D5W IVPB  750 mg IntraVENous Q24H    0.9% sodium chloride infusion  125 mL/hr IntraVENous CONTINUOUS     ______________________________________________________________________  EXPECTED LENGTH OF STAY: - - -  ACTUAL LENGTH OF STAY:          1                 Matthias Butler MD

## 2018-05-05 NOTE — ED PROVIDER NOTES
HPI Comments: 23 y.o. male with no significant past medical history who presents from Home with chief complaint of Shortness of Breath. Patient reports sudden onset \"last night\" of chest pain and shortness of breath \"after smoking crack cocaine all night, to the point that he felt that if he took another hit he would die\". Patient reports recreational drug use described as crack cocaine \"which he smokes and holds in for an extended period of time\". Patient reports that he did not seek medical attention last night when he initially had onset and when he woke up this morning he decided to seek medical care this afternoon. Patient states continous shortness of breath specifically \"when taking in a deep breath\". Pt states accompanying chest pain and palpations. Pt denies previous history of symptoms similar to those presented today. Pt denies significant cardiac or pulmonary history. Pt denies diaphoresis, jaw pain, back pain, fever, chills, cough, congestion, abdominal pain, nausea, vomiting, diarrhea, difficulty with urination or dysuria. There are no other acute medical concerns at this time. History reviewed. No pertinent past medical history. History reviewed. No pertinent surgical history. Social hx: Drug Use: Yes    The history is provided by the patient. History reviewed. No pertinent past medical history. History reviewed. No pertinent surgical history. History reviewed. No pertinent family history. Social History     Social History    Marital status: SINGLE     Spouse name: N/A    Number of children: N/A    Years of education: N/A     Occupational History    Not on file.      Social History Main Topics    Smoking status: Current Every Day Smoker    Smokeless tobacco: Never Used    Alcohol use Yes    Drug use: Yes     Special: Cocaine    Sexual activity: Not on file     Other Topics Concern    Not on file     Social History Narrative    No narrative on file         ALLERGIES: Review of patient's allergies indicates no known allergies. Review of Systems   Constitutional: Negative for activity change, appetite change, chills, diaphoresis, fatigue and fever. HENT: Negative for congestion, ear discharge, ear pain, sinus pain, sinus pressure, sore throat and trouble swallowing. Eyes: Negative for photophobia, pain, redness, itching and visual disturbance. Respiratory: Positive for chest tightness (pain on inspiration) and shortness of breath. Negative for cough. Cardiovascular: Positive for chest pain and palpitations. Gastrointestinal: Negative for abdominal distention, abdominal pain, diarrhea, nausea and vomiting. Endocrine: Negative. Genitourinary: Negative for difficulty urinating, dysuria, frequency and urgency. Musculoskeletal: Negative for back pain, neck pain and neck stiffness. Skin: Negative for color change, pallor, rash and wound. Allergic/Immunologic: Negative. Neurological: Negative for dizziness, syncope, weakness and headaches. Hematological: Does not bruise/bleed easily. Psychiatric/Behavioral: Negative for behavioral problems. The patient is not nervous/anxious. Impending feeling of doom while smoking crack cocaine   All other systems reviewed and are negative. Vitals:    05/04/18 1810   BP: 124/82   Pulse: (!) 107   Resp: 20   Temp: 98.8 °F (37.1 °C)   SpO2: 92%   Weight: 68 kg (150 lb)   Height: 5' 11\" (1.803 m)            Physical Exam   Constitutional: He is oriented to person, place, and time. He appears well-developed and well-nourished. He appears distressed. Anxious appearing male   HENT:   Head: Normocephalic and atraumatic. Right Ear: External ear normal.   Left Ear: External ear normal.   Nose: Nose normal.   Mouth/Throat: Oropharynx is clear and moist.   Eyes: Conjunctivae and EOM are normal. Pupils are equal, round, and reactive to light. Right eye exhibits no discharge. Left eye exhibits no discharge.    Neck: Normal range of motion. Neck supple. No JVD present. No tracheal deviation present. Cardiovascular: Regular rhythm, normal heart sounds and intact distal pulses. Exam reveals no gallop. No murmur heard. Tachycardia  100's   Pulmonary/Chest: Effort normal and breath sounds normal. No respiratory distress. He has no wheezes. He has no rales. He exhibits tenderness (pain on deep inspiration). Abdominal: Soft. Bowel sounds are normal. He exhibits no distension. There is no tenderness. There is no rebound and no guarding. Genitourinary:   Genitourinary Comments: Negative     Musculoskeletal: Normal range of motion. He exhibits no edema or tenderness. Neurological: He is alert and oriented to person, place, and time. Skin: Skin is warm and dry. No rash noted. No erythema. No pallor. Psychiatric: His behavior is normal. Judgment and thought content normal.   Anxious appearing male   Nursing note and vitals reviewed. MDM  Number of Diagnoses or Management Options  Atypical chest pain: new and requires workup  Drug abuse: new and requires workup  Leukocytosis, unspecified type: new and requires workup  Pneumomediastinum [facility]): new and requires workup  Diagnosis management comments: Plan;  Admit to hospital for further evaluation.        Amount and/or Complexity of Data Reviewed  Clinical lab tests: ordered and reviewed  Tests in the radiology section of CPT®: ordered and reviewed  Decide to obtain previous medical records or to obtain history from someone other than the patient: yes  Review and summarize past medical records: yes  Discuss the patient with other providers: yes  Independent visualization of images, tracings, or specimens: yes    Risk of Complications, Morbidity, and/or Mortality  Presenting problems: high  Diagnostic procedures: high  Management options: high    Critical Care  Total time providing critical care: (Total critical care time spent exclusive of procedures: 60 minutes  )    Patient Progress  Patient progress: stable      ED Course   8:25 PM Spoke with Dr. Giselle Rasmussen regarding the plan of care and lab results, radiology results. Patient to be admitted to hospital for further evaluation. 8:40 PM Spoke with Dr. Sunil Garcia regarding lab and radiology findings. Will admit to hospital.  Patient in agreement with plan of care. Discussed with the thoracic surgeon and he will follow. No acute interventions required presently. Procedures    The patient's CT reading is noted. With the pneumomediastinum, and a diffuse interstitial process bilaterally, the patient is being admitted to the medical service. The thoracic surgeon was also consulted and he will look at the films but does not feel like any intervention is necessary at this time. He states that if there is any worsening of symptoms during the night to let him know, otherwise he will see the patient in the morning. Pankaj Lewis MD

## 2018-05-05 NOTE — H&P
[address]  HISTORY AND PHYSICAL      Name:Gabi ROMAN  MR#: [id number]  : 1995  ACCOUNT #: [de-identified]   ADMIT DATE: 2018    DATE OF ADMISSION:  2018. CHIEF COMPLAINT:  Chest pain. HISTORY OF PRESENT ILLNESS:  This is a 24-year-old white male with past medical history of crack cocaine abuse, presented to the Emergency Department from home with chief complaint of chest pain and shortness of breath. Patient notes he had smoked crack cocaine all last night and he had acute onset of chest pain located in the substernal region, severe, constant, aggravated with deep breathing, without specific alleviating factors. Dull aching. According to ER records, patient had a sense of impending doom while smoking crack cocaine. He had reportedly felt like he was going to die. At that time the patient had not sought any medical attention. Subsequently continued to have chest pain along with shortness of breath and palpitations. Finally came to the Emergency Department 2018 to the ED for evaluation. When he arrived in the Emergency Department, initial recorded vital signs:  Blood pressure 124/82, heart rate 107, respiratory rate of 20, O2 saturation 92% on room air. Workup in the ED included 12-lead EKG showed normal sinus rhythm at 100 beats per minute. Troponin was less than 0.04. Chest x-ray PA and lateral showed diffuse airspace process in both lower lobes, on the right greater than left, with right middle lobe and possibly lingula involvement and pneumomediastinum. ED then requested the patient be admitted to the hospitalist service. There were no reports of syncope, loss of consciousness, slurred speech, facial droop, headache, neck pain, abdominal pain, nausea, vomiting, diarrhea, melena, dysuria, hematuria, calf pain, swelling, edema, fever, chills, rash. PAST MEDICAL HISTORY:  Crack cocaine abuse, tobacco abuse.     PAST SURGICAL HISTORY: None.    MEDICATIONS:  None. ALLERGIES:  NO KNOWN DRUG ALLERGIES. SOCIAL HISTORY:  Positive smoking cigarettes approximately half a pack per day. Positive for alcohol occasional.  Positive for crack cocaine. FAMILY HISTORY:  Hypertension grandfather, grandmother. Stroke grandfather. REVIEW OF SYSTEMS:  Pertinent positives were as noted in HPI. All other systems were reviewed and are negative. PHYSICAL EXAMINATION:  VITAL SIGNS:  Temperature 98.8 degrees Fahrenheit, blood pressure 110/86, heart rate 87, respiration rate of 20, O2 saturation 90% on room air. Placed the patient on 2 L oxygen via nasal cannula during exam.  Reported weight 150 pounds (68 kg), height of 5 feet 11 inches tall. PHYSICAL EXAMINATION:   GENERAL:  The patient is in no acute respiratory distress. PSYCHIATRIC:  The patient is awake, alert and oriented x3. NEUROLOGIC:  GCS of 15. Moves extremities x4. Sensation grossly intact without slurred speech or facial droop. HEENT:  Normocephalic, atraumatic. PERRLA. TMs intact. There is slight lateral left eye strabismus. Nares are patent. Oropharynx is clear. Tongue is midline, nonedematous. NECK:  Supple, without lymphadenopathy, JVD, carotid bruit, or thyromegaly. LYMPH:  Negative for cervical, supraclavicular adenopathy. LUNGS:  bilateral rales. CARDIOVASCULAR:  Heart regular rate and rhythm, normal S1, S2, without murmurs, rubs or gallops. ABDOMEN:  Soft, nontender, nondistended. Normoactive bowel sounds. No rebound, no guarding, rigidity. No auscultated abdominal bruit or pulsatile mass. BACK:  No CVA tenderness. No step-off. MUSCULOSKELETAL:  No acute palpable bony deformity. Negative for calf tenderness. VASCULAR:  There is radial 1+ . Dorsalis pedis pulse without cyanosis, clubbing, or edema. SKIN:  Warm and dry. LABORATORY:  Reviewed. Sodium is 139, potassium 4.3, chloride 105, CO2 28, BUN 17, creatinine 1.15, glucose 100.   Anion gap is 6, calcium is 9.2. GFR greater than 60. Total bilirubin 1.5, total protein 7.9, albumin 3.7. ALT 15, AST 16, alkaline phosphatase is 58, lactic acid 0.5. Troponin I less than 0.04. WBC 17.8, hemoglobin 15.2, hematocrit 46.0, platelets 904, neutrophils 82%. Urinalysis:  Leukocyte esterase negative, nitrites negative, urobilinogen is 1.0, bilirubin negative, blood negative, ketones trace, glucose negative, protein negative, pH 6.0, specific gravity 1.010. WBC 0-4, RBC 0-5, bacteria negative. Urine drug screen positive for cocaine. ABG:  pH 7.391, pCO2 of 38.9, pO2 of 55, bicarbonate 23.6, base deficit -1, SaO2 of 88% on room air. INR 1.2. Methadone negative. IMAGING:  CTA of the chest with and without contrast reveals diffuse airspace process in the central portion of both lungs, on the right greater than the left, with pneumomediastinum but no evidence of pneumothorax and no evidence of pulmonary embolus or aortic dissection. Chest x-ray PA and lateral was also noted in HPI. Twelve-lead EKG:  Normal sinus rhythm at 100 beats per minute. ASSESSMENT AND PLAN:  1. Pneumomediastinum. Admit patient to telemetry. I discussed the case with pulmonologist on call in regards to clinical findings. The patient was seen by thoracic surgery service for further evaluation. 2.  Acute hypoxic respiratory failure. Order oxygen therapy and pulse oximetry. Of note, CT was negative for PE. 3.  Acute chest pain. Continue telemetry monitoring, repeat serial troponin levels, consider for cardiology consultation in a.m.  4.  Pneumonia -- concern for the same based on airspace process noted per chest x-ray and CT of the chest.  Order IV antibiotics. 5.  System inflammatory response syndrome. Check blood cultures. Lactic acid level had been sent and was negative. The patient was placed on empiric IV antibiotics with Zosyn and levofloxacin. Patient with vancomycin as well.   6.  Tobacco abuse.  Encouraged smoking cessation. Will order nicotine patch. 7.  Crack cocaine abuse. Strongly encouraged drug cessation. 8.  Leukocytosis. Repeat CBC. 9.  Hyperbilirubinemia. Place add-on test for direct bilirubin, repeat comprehensive metabolic panel in a.m. 10.  DVT, PE prophylaxis. SCDs to lower extremities. MD BEE Royal/RN  D: 05/05/2018 01:20     T: 05/05/2018 02:15  JOB #: [id number]

## 2018-05-05 NOTE — PROGRESS NOTES
Pharmacist Note - Vancomycin Dosing    Consult provided for this 23 y.o. male for indication of SIRS. Antibiotic regimen(s): Vancomycin + Zosyn + Levaquin    Recent Labs      18   1830   WBC  17.8*   CREA  1.15   BUN  17     Frequency of BMP: cassidy  Height: 180.3 cm  Weight: 70.1 kg  Est CrCl: 98.2 ml/min  Temp (24hrs), Av.6 °F (37 °C), Min:97.9 °F (36.6 °C), Max:99.1 °F (37.3 °C)  Cultures: blood and MRSA pending      Goal trough = 15 - 20 mcg/mL    Therapy will be initiated with a loading dose of 1750 mg IV x 1 to be followed by a maintenance dose of 750 mg IV every 8 hours. Pharmacy to follow patient daily and order levels / make dose adjustments as appropriate.

## 2018-05-05 NOTE — CONSULTS
Asked to see Mr. Bob Mendosa re: pneumomediastinum    Mr. Bob Mendosa is a 24 y/o gentleman admitted through the ER last night with chest pain and pneumomediastinum. He has a past medical history significant for polysubstance abuse. He reported that after using crack cocaine the previous night he had engaged in [place] holding\" and had a coughing spell. Subsequent to this he experienced the sudden onset of severe substernal chest pain. He couldn't find relief. Cardiac work up negative. He did have a chest CT. He was admitted and kept NPO. Allergies: NKDA    PMHx: none    PSHx: none    SocHx: + tob, + cocaine    FamHx: no history of pulmonary disorders    Meds: none    ROS:    Constitutional- denies weight loss  HEENT- denies odynophagia  Neuro- denies syncope  Optho- no changes in vision  Resp- dyspneic  CV- substernal chest pain  GI- denies abd pain  - no complaints  ID- denies recent fevers  Vasc- denies claudication    Afebrile  P 70  /60    On exam he is laying on bed  Alert and oriented  Normal affect, normal speech  Anicteric  Well developed  No cervical or supraclavicular lymphadenopathy  Lungs coarse breath sounds b/l  RRR, no murmurs  Anterior chest pall non tender, no crepitus  Radial pulses palp b/l  Abd SNTND, no organomegaly  LE non edematous    =============================    WBC 13.9  Hgb 13.7  Plts 196    Cr 0.94    =============================    I have personally reviewed his Chest CT. He has extensive parenchymal changes/air space disease throughout both lungs. Some apical bullous disease. No pneumothorax. He does have anterior pneumomediastinum. No stranding or fluid collections posteriorly.    ================================    Diagnoses  1- Pneumomediastinum secondary to Barotrauma  2- diffuse airspace disease  3- cocaine abuse    ===============================    Mr. Bob Mendosa remains hemodynamically stable. Afebrile and not tachycardic. Chest pain is improved.   Most likely, his pneumomediastinum is secondary to barotrauma. No evidence of esophageal perforation or tracheal disruption. WBC trending down. By Chest CT criteria he has advanced airspace parenchymal disease for his age. I informed him of his lung disease/changes. At this time he is clinically stable. No indication for Esophagram or for endoscopy/bronchoscopy at this time. Advance diet as tolerated. If he remains stable after tolerating a regular diet would consider discharge home.

## 2018-05-05 NOTE — PROGRESS NOTES
Problem: Falls - Risk of  Goal: *Absence of Falls  Document Venu Fall Risk and appropriate interventions in the flowsheet.    Outcome: Progressing Towards Goal  Fall Risk Interventions:            Medication Interventions: Evaluate medications/consider consulting pharmacy, Patient to call before getting OOB

## 2018-05-05 NOTE — ROUTINE PROCESS
Bedside shift change report given to [address] [place] [address] (oncoming nurse) by Alisha Vargas (offgoing nurse). Report included the following information SBAR, Procedure Summary, Intake/Output, Med Rec Status and Cardiac Rhythm NSR.

## 2018-05-05 NOTE — PROGRESS NOTES
Received request from nurse to speak with pt regarding drug abuse prior to admission. Parents visiting earlier and expressed concern, pt aware. Met with pt at the bedside and reviewed role. He is aware of his parents' concern. He said the \"courts\" are making him live with his parents, resident of [address] [place] [address].  He has a  in [email or web address] as well. CM provided pt with resources including outpatient care at [address] [place] [address] (mental health services) and directed pt to follow up with intake if he wants to pursue outpatient SA services. Pt verbalized understanding.     Stephanie Camarillo MSW

## 2018-05-05 NOTE — PROGRESS NOTES
Admission Medication Reconciliation:    Information obtained from: patient     Significant PMH/Disease States:   History reviewed. No pertinent past medical history. Chief Complaint for this Admission:  respiratory distress     Allergies:  Review of patient's allergies indicates no known allergies. Prior to Admission Medications:   None         Comments/Recommendations: Patient denies regularly taking any prescription or non-prescription medications prior to admission.

## 2018-05-06 LAB
ANION GAP SERPL CALC-SCNC: 6 MMOL/L (ref 5–15)
BACTERIA SPEC CULT: ABNORMAL
BASOPHILS # BLD: 0 K/UL (ref 0–0.1)
BASOPHILS NFR BLD: 0 % (ref 0–1)
BUN SERPL-MCNC: 7 MG/DL (ref 6–20)
BUN/CREAT SERPL: 8 (ref 12–20)
CALCIUM SERPL-MCNC: 8.1 MG/DL (ref 8.5–10.1)
CHLORIDE SERPL-SCNC: 106 MMOL/L (ref 97–108)
CO2 SERPL-SCNC: 29 MMOL/L (ref 21–32)
CREAT SERPL-MCNC: 0.91 MG/DL (ref 0.7–1.3)
DIFFERENTIAL METHOD BLD: ABNORMAL
EOSINOPHIL # BLD: 0.2 K/UL (ref 0–0.4)
EOSINOPHIL NFR BLD: 2 % (ref 0–7)
ERYTHROCYTE [DISTWIDTH] IN BLOOD BY AUTOMATED COUNT: 14.3 % (ref 11.5–14.5)
GLUCOSE SERPL-MCNC: 96 MG/DL (ref 65–100)
HCT VFR BLD AUTO: 36.4 % (ref 36.6–50.3)
HGB BLD-MCNC: 11.8 G/DL (ref 12.1–17)
IMM GRANULOCYTES # BLD: 0 K/UL (ref 0–0.04)
IMM GRANULOCYTES NFR BLD AUTO: 0 % (ref 0–0.5)
LYMPHOCYTES # BLD: 2.3 K/UL (ref 0.8–3.5)
LYMPHOCYTES NFR BLD: 23 % (ref 12–49)
MCH RBC QN AUTO: 31.5 PG (ref 26–34)
MCHC RBC AUTO-ENTMCNC: 32.4 G/DL (ref 30–36.5)
MCV RBC AUTO: 97.1 FL (ref 80–99)
MONOCYTES # BLD: 1 K/UL (ref 0–1)
MONOCYTES NFR BLD: 9 % (ref 5–13)
NEUTS SEG # BLD: 6.7 K/UL (ref 1.8–8)
NEUTS SEG NFR BLD: 66 % (ref 32–75)
NRBC # BLD: 0 K/UL (ref 0–0.01)
NRBC BLD-RTO: 0 PER 100 WBC
PLATELET # BLD AUTO: 159 K/UL (ref 150–400)
PMV BLD AUTO: 8.8 FL (ref 8.9–12.9)
POTASSIUM SERPL-SCNC: 3.7 MMOL/L (ref 3.5–5.1)
RBC # BLD AUTO: 3.75 M/UL (ref 4.1–5.7)
SERVICE CMNT-IMP: ABNORMAL
SODIUM SERPL-SCNC: 141 MMOL/L (ref 136–145)
WBC # BLD AUTO: 10.1 K/UL (ref 4.1–11.1)

## 2018-05-06 PROCEDURE — 36415 COLL VENOUS BLD VENIPUNCTURE: CPT | Performed by: INTERNAL MEDICINE

## 2018-05-06 PROCEDURE — 85025 COMPLETE CBC W/AUTO DIFF WBC: CPT | Performed by: INTERNAL MEDICINE

## 2018-05-06 PROCEDURE — 77010033678 HC OXYGEN DAILY

## 2018-05-06 PROCEDURE — 77030027138 HC INCENT SPIROMETER -A

## 2018-05-06 PROCEDURE — 74011250636 HC RX REV CODE- 250/636: Performed by: FAMILY MEDICINE

## 2018-05-06 PROCEDURE — 74011000258 HC RX REV CODE- 258: Performed by: FAMILY MEDICINE

## 2018-05-06 PROCEDURE — 80048 BASIC METABOLIC PNL TOTAL CA: CPT | Performed by: INTERNAL MEDICINE

## 2018-05-06 PROCEDURE — 65270000029 HC RM PRIVATE

## 2018-05-06 PROCEDURE — 74011250637 HC RX REV CODE- 250/637: Performed by: INTERNAL MEDICINE

## 2018-05-06 RX ORDER — IBUPROFEN 200 MG
1 TABLET ORAL EVERY 24 HOURS
Status: DISCONTINUED | OUTPATIENT
Start: 2018-05-06 | End: 2018-05-07 | Stop reason: HOSPADM

## 2018-05-06 RX ADMIN — VANCOMYCIN HYDROCHLORIDE 1000 MG: 1 INJECTION, POWDER, LYOPHILIZED, FOR SOLUTION INTRAVENOUS at 06:03

## 2018-05-06 RX ADMIN — PIPERACILLIN AND TAZOBACTAM 3.38 G: 3; .375 INJECTION, POWDER, FOR SOLUTION INTRAVENOUS at 17:14

## 2018-05-06 RX ADMIN — Medication 10 ML: at 06:03

## 2018-05-06 RX ADMIN — VANCOMYCIN HYDROCHLORIDE 1000 MG: 1 INJECTION, POWDER, LYOPHILIZED, FOR SOLUTION INTRAVENOUS at 12:27

## 2018-05-06 RX ADMIN — Medication 10 ML: at 13:28

## 2018-05-06 RX ADMIN — PIPERACILLIN AND TAZOBACTAM 3.38 G: 3; .375 INJECTION, POWDER, FOR SOLUTION INTRAVENOUS at 01:07

## 2018-05-06 RX ADMIN — LEVOFLOXACIN 750 MG: 5 INJECTION, SOLUTION INTRAVENOUS at 23:05

## 2018-05-06 RX ADMIN — VANCOMYCIN HYDROCHLORIDE 1000 MG: 1 INJECTION, POWDER, LYOPHILIZED, FOR SOLUTION INTRAVENOUS at 20:35

## 2018-05-06 RX ADMIN — PIPERACILLIN AND TAZOBACTAM 3.38 G: 3; .375 INJECTION, POWDER, FOR SOLUTION INTRAVENOUS at 09:06

## 2018-05-06 NOTE — PROGRESS NOTES
Mr. Avelino Velazquez is HD#2, admitted for pneumomediastinum. No acute events overnight. Substernal pain improved. Tolerating a diet without complications. Afebrile  HD stable    On exam he is resting comfortably  I did not awaken him    No new imaging    WBC further decreased to 10    Diagnoses  1- Pneumomediastinum secondary to Barotrauma  2- diffuse airspace disease  3- cocaine abuse     Mr. Avelino Velazquez is progressing well. Seen by care management yesterday. Substance abuse issues being addressed. No clinical evidence of progression of his pneumomediastinum. Underlying pathology barotrauma. He does not need any further imaging or Thoracic surgery follow up. Thank you for allowing us to care for Mr. Avelino Velazquez. Please contact us for any further needs.

## 2018-05-06 NOTE — PROGRESS NOTES
Hospitalist Progress Note  Jj Negron MD  Answering service: [phone] from in house phone         Date of Service:  2018  NAME:  Raúl Bender  :  1995  MRN:  [id number]      Admission Summary:   HISTORY OF PRESENT ILLNESS:  This is a 78-year-old white male with past medical history of crack cocaine abuse, presented to the Emergency Department from home with chief complaint of chest pain and shortness of breath. Patient notes he had smoked crack cocaine all last night and he had acute onset of chest pain located in the substernal region, severe, constant, aggravated with deep breathing, without specific alleviating factors. Dull aching. According to ER records, patient had a sense of impending doom while smoking crack cocaine. He had reportedly felt like he was going to die. At that time the patient had not sought any medical attention. Subsequently continued to have chest pain along with shortness of breath and palpitations. Finally came to the Emergency Department 2018 to the ED for evaluation. Interval history / Subjective:         2018   This note is a follow up note for multiple medical issues as listed below and partially expanded on herewith. Pt with mild diffuse congestion, wbc down no fever, will cont current rx today,  Advance diet in am if stable then discharge by Tuesday on oral abx          Assessment & Plan:     Pneumomediastinum; improved subjectively, see CT surg note, pt was using recreational drugs ( cocain) which known to cause this condition. Pt is better advacning diet in am  Anticipated for . CT surg signed off. Tobacco abuse, discussed,     cocain abuse , discussed.     Code status: full   DVT prophylaxis: SCD;s     Care Plan discussed with: Patient/Family and Nurse  Disposition: Home w/Family and TBD     Hospital Problems  Date Reviewed: 2018          Codes Class Noted POA    Shortness of breath ICD-10-CM: R06.02  ICD-9-CM: 786.05  5/4/2018 Unknown        * (Principal)Pneumomediastinum ([address] ICD-10-CM: J98.2  ICD-9-CM: 518.1  5/4/2018 Unknown        Crack cocaine use ICD-10-CM: F14.90  ICD-9-CM: 305.60  5/4/2018 Unknown        Acute chest pain ICD-10-CM: R07.9  ICD-9-CM: 786.50  5/4/2018 Unknown                Review of Systems:    over all better, no sob no n./v/d , leandra diet,     Vital Signs:    Last 24hrs VS reviewed since prior progress note. Most recent are:  Visit Vitals    /61 (BP 1 Location: Right arm, BP Patient Position: At rest)    Pulse 69    Temp 98.2 °F (36.8 °C)    Resp 16    Ht 5' 11\" (1.803 m)    Wt 70.3 kg (154 lb 15.7 oz)    SpO2 100%    BMI 21.62 kg/m2         Intake/Output Summary (Last 24 hours) at 05/06/18 1457  Last data filed at 05/06/18 0414   Gross per 24 hour   Intake              720 ml   Output             1975 ml   Net            -1255 ml        Physical Examination:             Constitutional:  No acute distress, cooperative, pleasant    ENT:  Oral mucous moist, oropharynx benign. Neck supple,    Resp:  diffuse pul rhonchi, mild,  No wheezing  No accessory muscle use   CV:  Regular rhythm, normal rate, no murmurs, gallops, rubs    GI:  Soft, non distended, non tender. normoactive bowel sounds, no hepatosplenomegaly     Musculoskeletal:  No edema, warm, 2+ pulses throughout    Neurologic:  Moves all extremities. AAOx3, CN II-XII reviewed     Psych:  Good insight, Not anxious nor agitated.   Skin:  Good turgor, no rashes or ulcers       Data Review:    Review and/or order of clinical lab test      Labs:     Recent Labs      05/06/18   0338  05/05/18   0400   WBC  10.1  13.9*   HGB  11.8*  13.7   HCT  36.4*  42.5   PLT  159  194     Recent Labs      05/06/18   0338  05/05/18   0400  05/04/18   1830   NA  141  139  139   K  3.7  3.9  4.3   CL  106  106  105   CO2  29  26  28   BUN  7  16  17   CREA  0.91  0.94  1.15   GLU  96  95  100   CA 8. 1*  8.8  9.2     Recent Labs      05/05/18   0400  05/04/18   1830   SGOT  15  16   ALT  13  15   AP  50  58   TBILI  1.2*  1.5*   TP  7.0  7.9   ALB  3.2*  3.7   GLOB  3.8  4.2*     Recent Labs      05/04/18   2142   INR  1.2*   PTP  12.1*   APTT  28.3      No results for input(s): FE, TIBC, PSAT, FERR in the last 72 hours. No results found for: FOL, RBCF   No results for input(s): PH, PCO2, PO2 in the last 72 hours.   Recent Labs      05/04/18   1830   TROIQ  <0.04     Lab Results   Component Value Date/Time    Cholesterol, total 119 05/05/2018 04:00 AM    HDL Cholesterol 73 05/05/2018 04:00 AM    LDL, calculated 32 05/05/2018 04:00 AM    Triglyceride 70 05/05/2018 04:00 AM    CHOL/HDL Ratio 1.6 05/05/2018 04:00 AM     No results found for: [facility]  Lab Results   Component Value Date/Time    Color YELLOW/STRAW 05/04/2018 10:25 PM    Appearance CLEAR 05/04/2018 10:25 PM    Specific gravity 1.010 05/04/2018 10:25 PM    pH (UA) 6.0 05/04/2018 10:25 PM    Protein NEGATIVE  05/04/2018 10:25 PM    Glucose NEGATIVE  05/04/2018 10:25 PM    Ketone TRACE (A) 05/04/2018 10:25 PM    Bilirubin NEGATIVE  05/04/2018 10:25 PM    Urobilinogen 1.0 05/04/2018 10:25 PM    Nitrites NEGATIVE  05/04/2018 10:25 PM    Leukocyte Esterase NEGATIVE  05/04/2018 10:25 PM    Epithelial cells FEW 05/04/2018 10:25 PM    Bacteria NEGATIVE  05/04/2018 10:25 PM    WBC 0-4 05/04/2018 10:25 PM    RBC 0-5 05/04/2018 10:25 PM         Medications Reviewed:     Current Facility-Administered Medications   Medication Dose Route Frequency    Vancomycin Pharmacy Dosing   Other Rx Dosing/Monitoring    acetaminophen (TYLENOL) tablet 650 mg  650 mg Oral Q6H PRN    vancomycin (VANCOCIN) 1,000 mg in 0.9% sodium chloride (MBP/ADV) 250 mL  1 g IntraVENous Q8H    sodium chloride (NS) flush 5-10 mL  5-10 mL IntraVENous Q8H    sodium chloride (NS) flush 5-10 mL  5-10 mL IntraVENous PRN    sodium chloride (NS) flush 5-10 mL  5-10 mL IntraVENous PRN    piperacillin-tazobactam (ZOSYN) 3.375 g in 0.9% sodium chloride (MBP/ADV) 100 mL  3.375 g IntraVENous Q8H    levoFLOXacin (LEVAQUIN) 750 mg in D5W IVPB  750 mg IntraVENous Q24H    0.9% sodium chloride infusion  125 mL/hr IntraVENous CONTINUOUS     ______________________________________________________________________  EXPECTED LENGTH OF STAY: - - -  ACTUAL LENGTH OF STAY:          2                 Lavonne Worrell MD

## 2018-05-06 NOTE — PROGRESS NOTES
Rec'd report from MEREDITH/Colten Acosta 1106 at bedside. Pt resting in bed. Patient now have a transfer order. 1209 Report given to nurse, for room [phone] microbiology called patient positive for MRSA. Called Dr. Janette Estrella patient request for nicotine patch and regular diet--rec'd verbal telephone order. 1320 Transfer to unit.

## 2018-05-06 NOTE — PROGRESS NOTES
Bedside shift change report given to NAYA Austin (oncoming nurse) by Alma Puentes RN (offgoing nurse). Report included the following information SBAR, Intake/Output, MAR, Recent Results and Cardiac Rhythm NSR.

## 2018-05-07 VITALS
WEIGHT: 154.98 LBS | HEART RATE: 61 BPM | BODY MASS INDEX: 21.7 KG/M2 | OXYGEN SATURATION: 97 % | SYSTOLIC BLOOD PRESSURE: 131 MMHG | HEIGHT: 71 IN | RESPIRATION RATE: 16 BRPM | DIASTOLIC BLOOD PRESSURE: 79 MMHG | TEMPERATURE: 97.8 F

## 2018-05-07 PROBLEM — J98.2 PNEUMOMEDIASTINUM (HCC): Status: RESOLVED | Noted: 2018-05-04 | Resolved: 2018-05-07

## 2018-05-07 PROBLEM — F14.90 CRACK COCAINE USE: Status: RESOLVED | Noted: 2018-05-04 | Resolved: 2018-05-07

## 2018-05-07 PROBLEM — R07.9 ACUTE CHEST PAIN: Status: RESOLVED | Noted: 2018-05-04 | Resolved: 2018-05-07

## 2018-05-07 PROBLEM — R06.02 SHORTNESS OF BREATH: Status: RESOLVED | Noted: 2018-05-04 | Resolved: 2018-05-07

## 2018-05-07 LAB
ALBUMIN SERPL-MCNC: 3 G/DL (ref 3.5–5)
ALBUMIN/GLOB SERPL: 0.8 {RATIO} (ref 1.1–2.2)
ALP SERPL-CCNC: 40 U/L (ref 45–117)
ALT SERPL-CCNC: 13 U/L (ref 12–78)
ANION GAP SERPL CALC-SCNC: 6 MMOL/L (ref 5–15)
AST SERPL-CCNC: 7 U/L (ref 15–37)
BASOPHILS # BLD: 0 K/UL (ref 0–0.1)
BASOPHILS NFR BLD: 0 % (ref 0–1)
BILIRUB SERPL-MCNC: 0.9 MG/DL (ref 0.2–1)
BUN SERPL-MCNC: 7 MG/DL (ref 6–20)
BUN/CREAT SERPL: 7 (ref 12–20)
CALCIUM SERPL-MCNC: 8.9 MG/DL (ref 8.5–10.1)
CHLORIDE SERPL-SCNC: 106 MMOL/L (ref 97–108)
CO2 SERPL-SCNC: 29 MMOL/L (ref 21–32)
CREAT SERPL-MCNC: 0.94 MG/DL (ref 0.7–1.3)
DATE LAST DOSE: ABNORMAL
DIFFERENTIAL METHOD BLD: ABNORMAL
EOSINOPHIL # BLD: 0.2 K/UL (ref 0–0.4)
EOSINOPHIL NFR BLD: 3 % (ref 0–7)
ERYTHROCYTE [DISTWIDTH] IN BLOOD BY AUTOMATED COUNT: 14.1 % (ref 11.5–14.5)
GLOBULIN SER CALC-MCNC: 3.9 G/DL (ref 2–4)
GLUCOSE SERPL-MCNC: 94 MG/DL (ref 65–100)
HCT VFR BLD AUTO: 37.8 % (ref 36.6–50.3)
HGB BLD-MCNC: 12.3 G/DL (ref 12.1–17)
IMM GRANULOCYTES # BLD: 0 K/UL (ref 0–0.04)
IMM GRANULOCYTES NFR BLD AUTO: 0 % (ref 0–0.5)
LYMPHOCYTES # BLD: 2 K/UL (ref 0.8–3.5)
LYMPHOCYTES NFR BLD: 37 % (ref 12–49)
MCH RBC QN AUTO: 31.2 PG (ref 26–34)
MCHC RBC AUTO-ENTMCNC: 32.5 G/DL (ref 30–36.5)
MCV RBC AUTO: 95.9 FL (ref 80–99)
MONOCYTES # BLD: 0.5 K/UL (ref 0–1)
MONOCYTES NFR BLD: 9 % (ref 5–13)
NEUTS SEG # BLD: 2.8 K/UL (ref 1.8–8)
NEUTS SEG NFR BLD: 51 % (ref 32–75)
NRBC # BLD: 0 K/UL (ref 0–0.01)
NRBC BLD-RTO: 0 PER 100 WBC
PLATELET # BLD AUTO: 176 K/UL (ref 150–400)
PMV BLD AUTO: 9.1 FL (ref 8.9–12.9)
POTASSIUM SERPL-SCNC: 4 MMOL/L (ref 3.5–5.1)
PROT SERPL-MCNC: 6.9 G/DL (ref 6.4–8.2)
RBC # BLD AUTO: 3.94 M/UL (ref 4.1–5.7)
REPORTED DOSE,DOSE: ABNORMAL UNITS
REPORTED DOSE/TIME,TMG: 2100
SODIUM SERPL-SCNC: 141 MMOL/L (ref 136–145)
VANCOMYCIN TROUGH SERPL-MCNC: 12.3 UG/ML (ref 5–10)
WBC # BLD AUTO: 5.5 K/UL (ref 4.1–11.1)

## 2018-05-07 PROCEDURE — 74011000258 HC RX REV CODE- 258: Performed by: FAMILY MEDICINE

## 2018-05-07 PROCEDURE — 74011250636 HC RX REV CODE- 250/636: Performed by: FAMILY MEDICINE

## 2018-05-07 PROCEDURE — 36415 COLL VENOUS BLD VENIPUNCTURE: CPT | Performed by: INTERNAL MEDICINE

## 2018-05-07 PROCEDURE — 85025 COMPLETE CBC W/AUTO DIFF WBC: CPT | Performed by: INTERNAL MEDICINE

## 2018-05-07 PROCEDURE — 80202 ASSAY OF VANCOMYCIN: CPT | Performed by: INTERNAL MEDICINE

## 2018-05-07 PROCEDURE — 80053 COMPREHEN METABOLIC PANEL: CPT | Performed by: INTERNAL MEDICINE

## 2018-05-07 RX ORDER — LEVOFLOXACIN 750 MG/1
750 TABLET ORAL DAILY
Qty: 3 TAB | Refills: 0 | Status: SHIPPED | OUTPATIENT
Start: 2018-05-07 | End: 2022-03-09 | Stop reason: ALTCHOICE

## 2018-05-07 RX ADMIN — VANCOMYCIN HYDROCHLORIDE 1000 MG: 1 INJECTION, POWDER, LYOPHILIZED, FOR SOLUTION INTRAVENOUS at 05:30

## 2018-05-07 RX ADMIN — PIPERACILLIN AND TAZOBACTAM 3.38 G: 3; .375 INJECTION, POWDER, FOR SOLUTION INTRAVENOUS at 01:34

## 2018-05-07 NOTE — DISCHARGE INSTRUCTIONS
Cocaine Misuse: Care Instructions  Your Care Instructions    Using cocaine can cause physical and mental harm. It can increase your heart rate and blood pressure, which can lead to a heart attack and even death. It can raise your body temperature. You may have nausea, vomiting, and chills. If you smoke cocaine, the fumes can cause breathing problems. If you snort cocaine, it can damage your nasal passages. If you inject cocaine, it can cause an abscess at the injection site or an infection throughout your body. You may become shaky and restless. You also may see or hear things that are not there (hallucinations), or believe things that are not true. When the doctor treated you, he or she may have:  · Watched your symptoms or done tests to find out how much cocaine was in your body. · Treated you to control your heart rate, temperature, and blood pressure. · Given you oxygen to help you breathe. · Given you medicine to settle your thoughts and help keep you calm. The doctor has checked you carefully, but problems can develop later. If you notice any problems or new symptoms, get medical treatment right away. How can you care for yourself at home? · When you use cocaine regularly, your body and brain get used to it. This is called dependency. If you are dependent on this drug, you may have withdrawal symptoms when you stop using it. You may feel drowsy, have vivid dreams, or feel hungry, tired, or depressed. You may also feel confused and have trouble thinking clearly. To help get past these symptoms:  ¨ Get plenty of rest.  ¨ Drink lots of fluids. ¨ Stay active, but don't tire yourself. ¨ Eat a healthy diet. · Talk to your doctor about drug counseling programs that can help you stop using cocaine. · When you stop using cocaine, you may develop abstinence syndrome, which can last for months.  Symptoms of this may include feeling depressed, being tired, having trouble concentrating, and craving cocaine. When should you call for help? Call 911 anytime you think you may need emergency care. For example, call if:  ? · You have symptoms of a heart attack. These may include:  ¨ Chest pain or pressure, or a strange feeling in the chest.  ¨ Sweating. ¨ Shortness of breath. ¨ Nausea or vomiting. ¨ Pain, pressure, or a strange feeling in the back, neck, jaw, or upper belly or in one or both shoulders or arms. ¨ A fast or irregular heartbeat. After you call 911, the  may tell you to chew 1 adult-strength or 2 to 4 low-dose aspirin. Wait for an ambulance. Do not try to drive yourself. ? · You feel you cannot stop from hurting yourself or someone else. ?Call your doctor now or seek immediate medical care if:  ? · You have severe side effects from using cocaine. These may include problems with thinking, such as seeing things that aren't there or thinking that someone is trying to harm you (paranoia). ? · You have new or worse withdrawal symptoms. ? Watch closely for changes in your health, and be sure to contact your doctor if:  ? · You need more help or support to stop. Where can you learn more? Go to http://paul-jigna.info/. Enter L030 in the search box to learn more about \"Cocaine Misuse: Care Instructions. \"  Current as of: November 3, 2016  Content Version: 11.4  © 4104-5756 [employer]. Care instructions adapted under license by [employer] (which disclaims liability or warranty for this information). If you have questions about a medical condition or this instruction, always ask your healthcare professional. [place] [address] any warranty or liability for your use of this information.        Discharge Instructions       PATIENT ID: Prakash Delgado  MRN: [id number]   YOB: 1995    DATE OF ADMISSION: 5/4/2018  6:32 PM    DATE OF DISCHARGE: 5/7/2018    PRIMARY CARE PROVIDER: Domitila Cummings MD     ATTENDING PHYSICIAN: Emily Tejada Edouard Martinez MD  DISCHARGING PROVIDER: Rhea Blum MD    To contact this individual call [phone] and ask the  to page. If unavailable ask to be transferred the Adult Hospitalist Department. DISCHARGE DIAGNOSES pneumomediastinum 2n2 barotrauma 2n2 cocain use    CONSULTATIONS: IP CONSULT TO HOSPITALIST  IP CONSULT TO THORACIC SURGERY    PROCEDURES/SURGERIES: * No surgery found *    PENDING TEST RESULTS:   At the time of discharge the following test results are still pending: na    FOLLOW UP APPOINTMENTS:   Follow-up Information     Follow up With Details Comments [address] In 1 week  [address] [facility] EMERGENCY DEP  If symptoms worsen [address]   [phone]    Jayde So, MD  pcp of choice as needed  Patient can only remember the practice name and not the physician             ADDITIONAL CARE RECOMMENDATIONS: na    DIET: Resume previous diet     ACTIVITY: Activity as tolerated    WOUND CARE: na    EQUIPMENT needed: na      DISCHARGE MEDICATIONS:   See Medication Reconciliation Form    · It is important that you take the medication exactly as they are prescribed. · Keep your medication in the bottles provided by the pharmacist and keep a list of the medication names, dosages, and times to be taken in your wallet. · Do not take other medications without consulting your doctor. NOTIFY YOUR PHYSICIAN FOR ANY OF THE FOLLOWING:   Fever over 101 degrees for 24 hours. Chest pain, shortness of breath, fever, chills, nausea, vomiting, diarrhea, change in mentation, falling, weakness, bleeding. Severe pain or pain not relieved by medications. Or, any other signs or symptoms that you may have questions about.       DISPOSITION:    Home With:   OT  PT  [facility]  RN       SNF/Inpatient Rehab/LTAC   x Independent/assisted living    Hospice    Other:           Signed:   Rhea Blum MD  5/7/2018  8:16 AM

## 2018-05-07 NOTE — PROGRESS NOTES
Bedside shift change report given to Mya (oncoming nurse) by Stefano Hicks (offgoing nurse). Report included the following information SBAR.

## 2018-05-07 NOTE — PROGRESS NOTES
.Primary Nurse Hilaria Chin RN and Sarai Ventura RN performed a dual skin assessment on this patient No impairment noted  Wally score is 22

## 2018-05-07 NOTE — PROGRESS NOTES
TRANSFER - IN REPORT:    Verbal report received from [address]) on Raúl Bender  being received from [facility](unit) for routine progression of care      Report consisted of patients Situation, Background, Assessment and   Recommendations(SBAR). Information from the following report(s) SBAR was reviewed with the receiving nurse. Opportunity for questions and clarification was provided. Assessment completed upon patients arrival to unit and care assumed.

## 2018-05-07 NOTE — PROGRESS NOTES
Hospitalist Progress Note  Vilma Cornell MD  Answering service: [phone] OR [phone] from in house phone         Date of Service:  2018  NAME:  Juliet Everett YOB: 1995  MRN:  [id number]      Admission Summary:   HISTORY OF PRESENT ILLNESS:  This is a 24-year-old white male with past medical history of crack cocaine abuse, presented to the Emergency Department from home with chief complaint of chest pain and shortness of breath. Patient notes he had smoked crack cocaine all last night and he had acute onset of chest pain located in the substernal region, severe, constant, aggravated with deep breathing, without specific alleviating factors. Dull aching. According to ER records, patient had a sense of impending doom while smoking crack cocaine. He had reportedly felt like he was going to die. At that time the patient had not sought any medical attention. Subsequently continued to have chest pain along with shortness of breath and palpitations. Finally came to the Emergency Department 2018 to the ED for evaluation. Interval history / Subjective:         2018   This note is a follow up note for multiple medical issues as listed below and partially expanded on herewith. Pt with mild diffuse congestion, wbc down no fever, will cont current rx today,  Advance diet in am if stable then discharge by Tuesday on oral abx          Assessment & Plan:     Pneumomediastinum; improved subjectively, see CT surg note, pt was using recreational drugs ( cocain) which known to cause this condition. Pt is better advacning diet in am  Anticipated for . CT surg signed off. Tobacco abuse, discussed,     cocain abuse , discussed.     Code status: full   DVT prophylaxis: SCD;s     Care Plan discussed with: Patient/Family and Nurse  Disposition: Home w/Family and TBD     Hospital Problems  Date Reviewed: 2018    None            Review of Systems:    over all better, no sob no n./v/d , leandra diet,     Vital Signs:    Last 24hrs VS reviewed since prior progress note. Most recent are:  Visit Vitals    /79 (BP 1 Location: Left arm, BP Patient Position: At rest)    Pulse 61    Temp 97.8 °F (36.6 °C)    Resp 16    Ht 5' 11\" (1.803 m)    Wt 70.3 kg (154 lb 15.7 oz)    SpO2 97%    BMI 21.62 kg/m2         Intake/Output Summary (Last 24 hours) at 05/07/18 1346  Last data filed at 05/07/18 9422   Gross per 24 hour   Intake             1090 ml   Output                0 ml   Net             1090 ml        Physical Examination:             Constitutional:  No acute distress, cooperative, pleasant    ENT:  Oral mucous moist, oropharynx benign. Neck supple,    Resp:  diffuse pul rhonchi, mild,  No wheezing  No accessory muscle use   CV:  Regular rhythm, normal rate, no murmurs, gallops, rubs    GI:  Soft, non distended, non tender. normoactive bowel sounds, no hepatosplenomegaly     Musculoskeletal:  No edema, warm, 2+ pulses throughout    Neurologic:  Moves all extremities. AAOx3, CN II-XII reviewed     Psych:  Good insight, Not anxious nor agitated.   Skin:  Good turgor, no rashes or ulcers       Data Review:    Review and/or order of clinical lab test      Labs:     Recent Labs      05/07/18 0528 05/06/18   0338   WBC  5.5  10.1   HGB  12.3  11.8*   HCT  37.8  36.4*   PLT  176  159     Recent Labs      05/07/18   0528  05/06/18   0338  05/05/18   0400   NA  141  141  139   K  4.0  3.7  3.9   CL  106  106  106   CO2  29  29  26   BUN  7  7  16   CREA  0.94  0.91  0.94   GLU  94  96  95   CA  8.9  8.1*  8.8     Recent Labs      05/07/18   0528  05/05/18   0400  05/04/18   1830   SGOT  7*  15  16   ALT  13  13  15   AP  40*  50  58   TBILI  0.9  1.2*  1.5*   TP  6.9  7.0  7.9   ALB  3.0*  3.2*  3.7   GLOB  3.9  3.8  4.2*     Recent Labs      05/04/18 2142   INR  1.2*   PTP  12.1*   APTT  28.3      No results for input(s): FE, TIBC, PSAT, FERR in the last 72 hours. No results found for: FOL, RBCF   No results for input(s): PH, PCO2, PO2 in the last 72 hours. Recent Labs      05/04/18   1830   TROIQ  <0.04     Lab Results   Component Value Date/Time    Cholesterol, total 119 05/05/2018 04:00 AM    HDL Cholesterol 73 05/05/2018 04:00 AM    LDL, calculated 32 05/05/2018 04:00 AM    Triglyceride 70 05/05/2018 04:00 AM    CHOL/HDL Ratio 1.6 05/05/2018 04:00 AM     No results found for: [facility]  Lab Results   Component Value Date/Time    Color YELLOW/STRAW 05/04/2018 10:25 PM    Appearance CLEAR 05/04/2018 10:25 PM    Specific gravity 1.010 05/04/2018 10:25 PM    pH (UA) 6.0 05/04/2018 10:25 PM    Protein NEGATIVE  05/04/2018 10:25 PM    Glucose NEGATIVE  05/04/2018 10:25 PM    Ketone TRACE (A) 05/04/2018 10:25 PM    Bilirubin NEGATIVE  05/04/2018 10:25 PM    Urobilinogen 1.0 05/04/2018 10:25 PM    Nitrites NEGATIVE  05/04/2018 10:25 PM    Leukocyte Esterase NEGATIVE  05/04/2018 10:25 PM    Epithelial cells FEW 05/04/2018 10:25 PM    Bacteria NEGATIVE  05/04/2018 10:25 PM    WBC 0-4 05/04/2018 10:25 PM    RBC 0-5 05/04/2018 10:25 PM         Medications Reviewed:     No current facility-administered medications for this encounter. Current Outpatient Prescriptions   Medication Sig    levoFLOXacin (LEVAQUIN) 750 mg tablet Take 1 Tab by mouth daily.      ______________________________________________________________________  EXPECTED LENGTH OF STAY: 5d 9h  ACTUAL LENGTH OF STAY:          Author MD [place]

## 2018-05-07 NOTE — DISCHARGE SUMMARY
Discharge Summary       PATIENT ID: Mayra Salas  MRN: [id number]   YOB: 1995    DATE OF ADMISSION: 5/4/2018  6:32 PM    DATE OF DISCHARGE: 5/7/2018    PRIMARY CARE PROVIDER: Dwight Snow MD     ATTENDING PHYSICIAN: Riky Park MD   DISCHARGING PROVIDER: Riky Park MD    To contact this individual call [phone] and ask the  to page. If unavailable ask to be transferred the Adult Hospitalist Department. CONSULTATIONS: IP CONSULT TO HOSPITALIST  IP CONSULT TO THORACIC SURGERY    PROCEDURES/SURGERIES: * No surgery found *    ADMITTING DIAGNOSES & HOSPITAL COURSE:     Per recent notes:    Issa Almonte MD Physician Signed Thoracic (non-cardiac) Surgery Progress Notes Date of Service: 05/06/18 0957         []Hide copied text  Mr. Jing Velasco is HD#2, admitted for pneumomediastinum. No acute events overnight. Substernal pain improved. Tolerating a diet without complications.      Afebrile  HD stable     On exam he is resting comfortably  I did not awaken him     No new imaging     WBC further decreased to 10     Diagnoses  1- Pneumomediastinum secondary to Barotrauma  2- diffuse airspace disease  3- cocaine abuse      Mr. Jing Velasco is progressing well. Seen by care management yesterday. Substance abuse issues being addressed. No clinical evidence of progression of his pneumomediastinum. Underlying pathology barotrauma. He does not need any further imaging or Thoracic surgery follow up. Thank you for allowing us to care for Mr. Jing Velasco.   Please contact us for any further needs.                      Admission Summary:   HISTORY OF PRESENT ILLNESS:  This is a 24-year-old white male with past medical history of crack cocaine abuse, presented to the Emergency Department from home with chief complaint of chest pain and shortness of breath.  Patient notes he had smoked crack cocaine all last night and he had acute onset of chest pain located in the substernal region, severe, constant, aggravated with deep breathing, without specific alleviating factors.  Dull aching.  According to ER records, patient had a sense of impending doom while smoking crack cocaine. Angela Sherman had reportedly felt like he was going to die.  At that time the patient had not sought any medical attention.  Subsequently continued to have chest pain along with shortness of breath and palpitations. Caty Armijo came to the Emergency Department 05/04/2018 to the ED for evaluation.      Interval history / Subjective:          5/6/2018   This note is a follow up note for multiple medical issues as listed below and partially expanded on herewith. Pt with mild diffuse congestion, wbc down no fever, will cont current rx today,  Advance diet in am if stable then discharge by Tuesday on oral abx          Assessment & Plan:      Pneumomediastinum; improved subjectively, see CT surg note, pt was using recreational drugs ( cocain) which known to cause this condition. Pt is better advacning diet in am  Anticipated for 5/6. CT surg signed off.      Tobacco abuse, discussed,      cocain abuse , discussed. Code status: full   DVT prophylaxis: SCD;s      Care Plan discussed with: Patient/Family and Nurse  Disposition: Home w/Family and TBD      Hospital Problems  Date Reviewed: 5/4/2018             Codes Class Noted POA     Shortness of breath ICD-10-CM: R06.02  ICD-9-CM: 786.05   5/4/2018 Unknown           * (Principal)Pneumomediastinum ([facility] [address] ICD-10-CM: J98.2  ICD-9-CM: 730. 1   5/4/2018 Unknown           Crack cocaine use ICD-10-CM: F14.90  ICD-9-CM: 305.60   5/4/2018 Unknown           Acute chest pain ICD-10-CM: R07.9  ICD-9-CM: 786.50   5/4/2018 Unknown                     Review of Systems:    over all better, no sob no n./v/d , leandra diet,      Vital Signs:    Last 24hrs VS reviewed since prior progress note.  Most recent are:       Visit Vitals    /61 (BP 1 Location: Right arm, BP Patient Position: At rest)    Pulse 69    Temp 98.2 °F (36.8 °C)    Resp 16    Ht 5' 11\" (1.803 m)    Wt 70.3 kg (154 lb 15.7 oz)    SpO2 100%    BMI 21.62 kg/m2            Intake/Output Summary (Last 24 hours) at 05/06/18 3913  Last data filed at 05/06/18 0414    Gross per 24 hour   Intake              720 ml   Output             1975 ml   Net            -1255 ml         Physical Examination:             Constitutional:  No acute distress, cooperative, pleasant    ENT:  Oral mucous moist, oropharynx benign. Neck supple,    Resp:  diffuse pul rhonchi, mild,  No wheezing  No accessory muscle use   CV:  Regular rhythm, normal rate, no murmurs, gallops, rubs    GI:  Soft, non distended, non tender. normoactive bowel sounds, no hepatosplenomegaly     Musculoskeletal:  No edema, warm, 2+ pulses throughout    Neurologic:  Moves all extremities. AAOx3, CN II-XII reviewed                                             Psych:  Good insight, Not anxious nor agitated. Skin:  Good turgor, no rashes or ulcers         Data Review:    Review and/or order of clinical lab test        Labs:           Recent Labs       05/06/18   0338  05/05/18   0400   WBC  10.1  13.9*   HGB  11.8*  13.7   HCT  36.4*  42.5   PLT  159  194            Recent Labs       05/06/18   0338  05/05/18   0400  05/04/18   1830   NA  141  139  139   K  3.7  3.9  4.3   CL  106  106  105   CO2  29  26  28   BUN  7  16  17   CREA  0.91  0.94  1.15   GLU  96  95  100   CA  8.1*  8.8  9.2      Recent Labs       05/05/18   0400  05/04/18   1830   SGOT  15  16   ALT  13  15   AP  50  58   TBILI  1.2*  1.5*   TP  7.0  7.9   ALB  3.2*  3.7   GLOB  3.8  4.2*          Recent Labs       05/04/18   2142   INR  1.2*   PTP  12.1*   APTT  28.3      No results for input(s): FE, TIBC, PSAT, FERR in the last 72 hours. No results found for: FOL, RBCF   No results for input(s): PH, PCO2, PO2 in the last 72 hours.       Recent Labs       05/04/18   1830   TROIQ  <0.04            Lab Results   Component Value Date/Time     Cholesterol, total 119 05/05/2018 04:00 AM     HDL Cholesterol 73 05/05/2018 04:00 AM     LDL, calculated 32 05/05/2018 04:00 AM     Triglyceride 70 05/05/2018 04:00 AM     CHOL/HDL Ratio 1.6 05/05/2018 04:00 AM      No results found for: [facility]        Lab Results   Component Value Date/Time     Color YELLOW/STRAW 05/04/2018 10:25 PM     Appearance CLEAR 05/04/2018 10:25 PM     Specific gravity 1.010 05/04/2018 10:25 PM     pH (UA) 6.0 05/04/2018 10:25 PM     Protein NEGATIVE  05/04/2018 10:25 PM     Glucose NEGATIVE  05/04/2018 10:25 PM     Ketone TRACE (A) 05/04/2018 10:25 PM     Bilirubin NEGATIVE  05/04/2018 10:25 PM     Urobilinogen 1.0 05/04/2018 10:25 PM     Nitrites NEGATIVE  05/04/2018 10:25 PM     Leukocyte Esterase NEGATIVE  05/04/2018 10:25 PM     Epithelial cells FEW 05/04/2018 10:25 PM     Bacteria NEGATIVE  05/04/2018 10:25 PM     WBC 0-4 05/04/2018 10:25 PM     RBC 0-5 05/04/2018 10:25 PM            Medications Reviewed:             Current Facility-Administered Medications   Medication Dose Route Frequency    Vancomycin Pharmacy Dosing    Other Rx Dosing/Monitoring    acetaminophen (TYLENOL) tablet 650 mg  650 mg Oral Q6H PRN    vancomycin (VANCOCIN) 1,000 mg in 0.9% sodium chloride (MBP/ADV) 250 mL  1 g IntraVENous Q8H    sodium chloride (NS) flush 5-10 mL  5-10 mL IntraVENous Q8H    sodium chloride (NS) flush 5-10 mL  5-10 mL IntraVENous PRN    sodium chloride (NS) flush 5-10 mL  5-10 mL IntraVENous PRN    piperacillin-tazobactam (ZOSYN) 3.375 g in 0.9% sodium chloride (MBP/ADV) 100 mL  3.375 g IntraVENous Q8H    levoFLOXacin (LEVAQUIN) 750 mg in D5W IVPB  750 mg IntraVENous Q24H    0.9% sodium chloride infusion  125 mL/hr IntraVENous CONTINUOUS              DISCHARGE DIAGNOSES / PLAN:      1.  as above        PENDING TEST RESULTS:   At the time of discharge the following test results are still pending: na    FOLLOW UP APPOINTMENTS:    Follow-up Information     Follow up With Details Comments Contact Info    [facility] In 1 week  [address] [facility] EMERGENCY DEP  If symptoms worsen [place]  [phone]    Jayde So MD  pcp of choice as needed  Patient can only remember the practice name and not the physician             ADDITIONAL CARE RECOMMENDATIONS: na    DIET: Resume previous diet     ACTIVITY: Activity as tolerated    WOUND CARE: na    EQUIPMENT needed: na      DISCHARGE MEDICATIONS:  Current Discharge Medication List      START taking these medications    Details   levoFLOXacin (LEVAQUIN) 750 mg tablet Take 1 Tab by mouth daily. Qty: 3 Tab, Refills: 0               NOTIFY YOUR PHYSICIAN FOR ANY OF THE FOLLOWING:   Fever over 101 degrees for 24 hours. Chest pain, shortness of breath, fever, chills, nausea, vomiting, diarrhea, change in mentation, falling, weakness, bleeding. Severe pain or pain not relieved by medications. Or, any other signs or symptoms that you may have questions about.     DISPOSITION:    Home With:   OT  PT  HH  RN       Long term SNF/Inpatient Rehab   x Independent/assisted living    Hospice    Other:       PATIENT CONDITION AT DISCHARGE:     Functional status    Poor     Deconditioned    x Independent      Cognition    x Lucid     Forgetful     Dementia      Catheters/lines (plus indication)    [place]     PICC     PEG    x None      Code status    x Full code     DNR      PHYSICAL EXAMINATION AT DISCHARGE:   Refer to Progress Note  Visit Vitals    /68 (BP 1 Location: Left arm, BP Patient Position: At rest)    Pulse (!) 55    Temp 98.1 °F (36.7 °C)    Resp 16    Ht 5' 11\" (1.803 m)    Wt 70.3 kg (154 lb 15.7 oz)    SpO2 97%    BMI 21.62 kg/m2          CHRONIC MEDICAL DIAGNOSES:  Problem List as of 5/7/2018  Date Reviewed: 5/7/2018          Codes Class Noted - Resolved    RESOLVED: Shortness of breath ICD-10-CM: R06.02  ICD-9-CM: 786.05  5/4/2018 - 5/7/2018        * (Principal)RESOLVED: Pneumomediastinum ([address] ICD-10-CM: J98.2  ICD-9-CM: 518.1  5/4/2018 - 5/7/2018        RESOLVED: Crack cocaine use ICD-10-CM: F14.90  ICD-9-CM: 305.60  5/4/2018 - 5/7/2018        RESOLVED: Acute chest pain ICD-10-CM: R07.9  ICD-9-CM: 786.50  5/4/2018 - 5/7/2018              Greater than  20  minutes were spent with the patient on counseling and coordination of care    Signed:   Diego Marques MD  5/7/2018  8:14 AM

## 2018-05-07 NOTE — PROGRESS NOTES
Pharmacist Note - Vancomycin Dosing  Therapy day 3  Indication: SIRS  Current regimen: 1000mg Q8h    A Trough Level resulted at 12.3 mcg/mL which was obtained 9 hrs post-dose. The extrapolated \"true\" trough is approximately 14 mcg/mL based on the patient's known kinetics. Goal trough: 15 - 20 mcg/mL     Plan: Continue current regimen. Vancomycin level is just slightly below goal range but leukocytosis has improved and patient has been afebrile. Pharmacy will continue to monitor this patient daily for changes in clinical status and renal function.

## 2018-05-07 NOTE — PROGRESS NOTES
I have reviewed discharge instructions with the patient. The patient verbalized understanding. Patient being sent home with all belongings, wheeled down to patient discharge by a volunteer and transported home by a friend.

## 2018-05-07 NOTE — PROGRESS NOTES
Bedside shift change report given to Jose Dale (oncoming nurse) by Mya (offgoing nurse). Report included the following information SBAR, Intake/Output, MAR and Recent Results.

## 2018-05-10 LAB
BACTERIA SPEC CULT: NORMAL
BACTERIA SPEC CULT: NORMAL
SERVICE CMNT-IMP: NORMAL
SERVICE CMNT-IMP: NORMAL

## 2018-05-17 NOTE — PROGRESS NOTES
Brief:    Pt had acute hypoxic resp failure, pt not on home 02 PTA    And had hypoxia on admission ; , SaO2 of 88% on room air. , resolved by   discharged.      Jordan Limon MD 5/17/2018

## 2018-06-06 ENCOUNTER — HOSPITAL ENCOUNTER (EMERGENCY)
Age: 23
Discharge: HOME | End: 2018-06-06
Attending: EMERGENCY MEDICINE | Admitting: EMERGENCY MEDICINE
Payer: SELF-PAY

## 2018-06-06 ENCOUNTER — APPOINTMENT (OUTPATIENT)
Dept: GENERAL RADIOLOGY | Age: 23
End: 2018-06-06
Attending: EMERGENCY MEDICINE
Payer: SELF-PAY

## 2018-06-06 VITALS
SYSTOLIC BLOOD PRESSURE: 142 MMHG | HEART RATE: 56 BPM | HEIGHT: 71 IN | WEIGHT: 153 LBS | RESPIRATION RATE: 18 BRPM | TEMPERATURE: 98.6 F | DIASTOLIC BLOOD PRESSURE: 44 MMHG | BODY MASS INDEX: 21.42 KG/M2 | OXYGEN SATURATION: 97 %

## 2018-06-06 DIAGNOSIS — K29.70 GASTRITIS WITHOUT BLEEDING, UNSPECIFIED CHRONICITY, UNSPECIFIED GASTRITIS TYPE: Primary | ICD-10-CM

## 2018-06-06 DIAGNOSIS — R11.10 HYPEREMESIS: ICD-10-CM

## 2018-06-06 LAB
ALBUMIN SERPL-MCNC: 4.5 G/DL (ref 3.5–5)
ALBUMIN/GLOB SERPL: 1 {RATIO} (ref 1.1–2.2)
ALP SERPL-CCNC: 63 U/L (ref 45–117)
ALT SERPL-CCNC: 19 U/L (ref 12–78)
AMYLASE SERPL-CCNC: 53 U/L (ref 25–115)
ANION GAP SERPL CALC-SCNC: 11 MMOL/L (ref 5–15)
AST SERPL-CCNC: 19 U/L (ref 15–37)
BASOPHILS # BLD: 0 K/UL (ref 0–0.1)
BASOPHILS NFR BLD: 0 % (ref 0–1)
BILIRUB SERPL-MCNC: 1 MG/DL (ref 0.2–1)
BUN SERPL-MCNC: 11 MG/DL (ref 6–20)
BUN/CREAT SERPL: 9 (ref 12–20)
CALCIUM SERPL-MCNC: 9.7 MG/DL (ref 8.5–10.1)
CHLORIDE SERPL-SCNC: 105 MMOL/L (ref 97–108)
CO2 SERPL-SCNC: 21 MMOL/L (ref 21–32)
CREAT SERPL-MCNC: 1.17 MG/DL (ref 0.7–1.3)
DIFFERENTIAL METHOD BLD: ABNORMAL
EOSINOPHIL # BLD: 0 K/UL (ref 0–0.4)
EOSINOPHIL NFR BLD: 0 % (ref 0–7)
ERYTHROCYTE [DISTWIDTH] IN BLOOD BY AUTOMATED COUNT: 14.6 % (ref 11.5–14.5)
GLOBULIN SER CALC-MCNC: 4.5 G/DL (ref 2–4)
GLUCOSE SERPL-MCNC: 107 MG/DL (ref 65–100)
HCT VFR BLD AUTO: 49.8 % (ref 36.6–50.3)
HGB BLD-MCNC: 16.8 G/DL (ref 12.1–17)
IMM GRANULOCYTES # BLD: 0 K/UL (ref 0–0.04)
IMM GRANULOCYTES NFR BLD AUTO: 0 % (ref 0–0.5)
LIPASE SERPL-CCNC: 48 U/L (ref 73–393)
LYMPHOCYTES # BLD: 1.7 K/UL (ref 0.8–3.5)
LYMPHOCYTES NFR BLD: 16 % (ref 12–49)
MCH RBC QN AUTO: 31.9 PG (ref 26–34)
MCHC RBC AUTO-ENTMCNC: 33.7 G/DL (ref 30–36.5)
MCV RBC AUTO: 94.7 FL (ref 80–99)
MONOCYTES # BLD: 0.5 K/UL (ref 0–1)
MONOCYTES NFR BLD: 5 % (ref 5–13)
NEUTS SEG # BLD: 8.5 K/UL (ref 1.8–8)
NEUTS SEG NFR BLD: 78 % (ref 32–75)
NRBC # BLD: 0 K/UL (ref 0–0.01)
NRBC BLD-RTO: 0 PER 100 WBC
PLATELET # BLD AUTO: 211 K/UL (ref 150–400)
PMV BLD AUTO: 9 FL (ref 8.9–12.9)
POTASSIUM SERPL-SCNC: 3.9 MMOL/L (ref 3.5–5.1)
PROT SERPL-MCNC: 9 G/DL (ref 6.4–8.2)
RBC # BLD AUTO: 5.26 M/UL (ref 4.1–5.7)
SODIUM SERPL-SCNC: 137 MMOL/L (ref 136–145)
WBC # BLD AUTO: 10.8 K/UL (ref 4.1–11.1)

## 2018-06-06 PROCEDURE — 96375 TX/PRO/DX INJ NEW DRUG ADDON: CPT

## 2018-06-06 PROCEDURE — 85025 COMPLETE CBC W/AUTO DIFF WBC: CPT | Performed by: EMERGENCY MEDICINE

## 2018-06-06 PROCEDURE — 74011000250 HC RX REV CODE- 250: Performed by: EMERGENCY MEDICINE

## 2018-06-06 PROCEDURE — 83690 ASSAY OF LIPASE: CPT | Performed by: EMERGENCY MEDICINE

## 2018-06-06 PROCEDURE — 82150 ASSAY OF AMYLASE: CPT | Performed by: EMERGENCY MEDICINE

## 2018-06-06 PROCEDURE — 99283 EMERGENCY DEPT VISIT LOW MDM: CPT

## 2018-06-06 PROCEDURE — 96361 HYDRATE IV INFUSION ADD-ON: CPT

## 2018-06-06 PROCEDURE — 96374 THER/PROPH/DIAG INJ IV PUSH: CPT

## 2018-06-06 PROCEDURE — 74011250636 HC RX REV CODE- 250/636: Performed by: EMERGENCY MEDICINE

## 2018-06-06 PROCEDURE — 96372 THER/PROPH/DIAG INJ SC/IM: CPT

## 2018-06-06 PROCEDURE — 80053 COMPREHEN METABOLIC PANEL: CPT | Performed by: EMERGENCY MEDICINE

## 2018-06-06 PROCEDURE — 74022 RADEX COMPL AQT ABD SERIES: CPT

## 2018-06-06 PROCEDURE — 36415 COLL VENOUS BLD VENIPUNCTURE: CPT | Performed by: EMERGENCY MEDICINE

## 2018-06-06 RX ORDER — ONDANSETRON 8 MG/1
8 TABLET, ORALLY DISINTEGRATING ORAL
Qty: 15 TAB | Refills: 0 | Status: SHIPPED | OUTPATIENT
Start: 2018-06-06 | End: 2022-03-09 | Stop reason: ALTCHOICE

## 2018-06-06 RX ORDER — FAMOTIDINE 20 MG/1
20 TABLET, FILM COATED ORAL 2 TIMES DAILY
Qty: 60 TAB | Refills: 0 | Status: ON HOLD | OUTPATIENT
Start: 2018-06-06 | End: 2022-03-28

## 2018-06-06 RX ORDER — HALOPERIDOL 5 MG/ML
5 INJECTION INTRAMUSCULAR
Status: COMPLETED | OUTPATIENT
Start: 2018-06-06 | End: 2018-06-06

## 2018-06-06 RX ORDER — DICYCLOMINE HYDROCHLORIDE 20 MG/1
20 TABLET ORAL EVERY 6 HOURS
Qty: 20 TAB | Refills: 0 | Status: SHIPPED | OUTPATIENT
Start: 2018-06-06 | End: 2018-06-11

## 2018-06-06 RX ADMIN — PROCHLORPERAZINE EDISYLATE 10 MG: 5 INJECTION INTRAMUSCULAR; INTRAVENOUS at 03:21

## 2018-06-06 RX ADMIN — FAMOTIDINE 20 MG: 10 INJECTION INTRAVENOUS at 03:21

## 2018-06-06 RX ADMIN — SODIUM CHLORIDE 1000 ML: 900 INJECTION, SOLUTION INTRAVENOUS at 03:21

## 2018-06-06 RX ADMIN — HALOPERIDOL LACTATE 5 MG: 5 INJECTION, SOLUTION INTRAMUSCULAR at 03:10

## 2018-06-06 NOTE — DISCHARGE INSTRUCTIONS
Gastritis: Care Instructions  Your Care Instructions    Gastritis is a sore and upset stomach. It happens when something irritates the stomach lining. Many things can cause it. These include an infection such as the flu or something you ate or drank. Medicines or a sore on the lining of the stomach (ulcer) also can cause it. Your belly may bloat and ache. You may belch, vomit, and feel sick to your stomach. You should be able to relieve the problem by taking medicine. And it may help to change your diet. If gastritis lasts, your doctor may prescribe medicine. Follow-up care is a key part of your treatment and safety. Be sure to make and go to all appointments, and call your doctor if you are having problems. It's also a good idea to know your test results and keep a list of the medicines you take. How can you care for yourself at home? · If your doctor prescribed antibiotics, take them as directed. Do not stop taking them just because you feel better. You need to take the full course of antibiotics. · Be safe with medicines. If your doctor prescribed medicine to decrease stomach acid, take it as directed. Call your doctor if you think you are having a problem with your medicine. · Do not take any other medicine, including over-the-counter pain relievers, without talking to your doctor first.  · If your doctor recommends over-the-counter medicine to reduce stomach acid, such as Pepcid AC, Prilosec, Tagamet HB, or Zantac 75, follow the directions on the label. · Drink plenty of fluids (enough so that your urine is light yellow or clear like water) to prevent dehydration. Choose water and other caffeine-free clear liquids. If you have kidney, heart, or liver disease and have to limit fluids, talk with your doctor before you increase the amount of fluids you drink. · Limit how much alcohol you drink. · Avoid coffee, tea, cola drinks, chocolate, and other foods with caffeine.  They increase stomach acid.  When should you call for help? Call 911 anytime you think you may need emergency care. For example, call if:  ? · You vomit blood or what looks like coffee grounds. ? · You pass maroon or very bloody stools. ?Call your doctor now or seek immediate medical care if:  ? · You start breathing fast and have not produced urine in the last 8 hours. ? · You cannot keep fluids down. ? Watch closely for changes in your health, and be sure to contact your doctor if:  ? · You do not get better as expected. Where can you learn more? Go to http://paul-jigna.info/. Enter [id number] in the search box to learn more about \"Gastritis: Care Instructions. \"  Current as of: May 12, 2017  Content Version: 11.4  © 3733-6305 [employer]. Care instructions adapted under license by [employer] (which disclaims liability or warranty for this information). If you have questions about a medical condition or this instruction, always ask your healthcare professional. [place] [address] any warranty or liability for your use of this information. Nausea and Vomiting: Care Instructions  Your Care Instructions    When you are nauseated, you may feel weak and sweaty and notice a lot of saliva in your mouth. Nausea often leads to vomiting. Most of the time you do not need to worry about nausea and vomiting, but they can be signs of other illnesses. Two common causes of nausea and vomiting are stomach flu and food poisoning. Nausea and vomiting from viral stomach flu will usually start to improve within 24 hours. Nausea and vomiting from food poisoning may last from 12 to 48 hours. The doctor has checked you carefully, but problems can develop later. If you notice any problems or new symptoms, get medical treatment right away. Follow-up care is a key part of your treatment and safety.  Be sure to make and go to all appointments, and call your doctor if you are having problems. It's also a good idea to know your test results and keep a list of the medicines you take. How can you care for yourself at home? · To prevent dehydration, drink plenty of fluids, enough so that your urine is light yellow or clear like water. Choose water and other caffeine-free clear liquids until you feel better. If you have kidney, heart, or liver disease and have to limit fluids, talk with your doctor before you increase the amount of fluids you drink. · Rest in bed until you feel better. · When you are able to eat, try clear soups, mild foods, and liquids until all symptoms are gone for 12 to 48 hours. Other good choices include dry toast, crackers, cooked cereal, and gelatin dessert, such as Jell-O. When should you call for help? Call 911 anytime you think you may need emergency care. For example, call if:  ? · You passed out (lost consciousness). ?Call your doctor now or seek immediate medical care if:  ? · You have symptoms of dehydration, such as:  ¨ Dry eyes and a dry mouth. ¨ Passing only a little dark urine. ¨ Feeling thirstier than usual.   ? · You have new or worsening belly pain. ? · You have a new or higher fever. ? · You vomit blood or what looks like coffee grounds. ? Watch closely for changes in your health, and be sure to contact your doctor if:  ? · You have ongoing nausea and vomiting. ? · Your vomiting is getting worse. ? · Your vomiting lasts longer than 2 days. ? · You are not getting better as expected. Where can you learn more? Go to http://paul-jigna.info/. Enter [id number] in the search box to learn more about \"Nausea and Vomiting: Care Instructions. \"  Current as of: March 20, 2017  Content Version: 11.4  © 5559-7736 [employer]. Care instructions adapted under license by [employer] (which disclaims liability or warranty for this information).  If you have questions about a medical condition or this instruction, always ask your healthcare professional. [place] [address] any warranty or liability for your use of this information.

## 2018-06-06 NOTE — ED NOTES
Patient has been medically cleared for discharge at this time. Given all discharge papers. Patient had all questions answered. He was seen leaving the department with a steady gait and all belongings.

## 2018-06-06 NOTE — ED TRIAGE NOTES
Arrived from home wit father c/o vomiting for one week. Past 4 hours vomiting has gotten worse. Patient states he is not able to hold anything down. +nausea. -dizziness, diarrhea and headaches. Patient has appointment wit Dr Flor Wang tomorrow.

## 2018-06-06 NOTE — ED NOTES
Called the lab in regards to CMP, amylase and lipase as they have been listed as in process for approximately 90 minutes.

## 2018-06-06 NOTE — ED NOTES
Assumed care of patient at this time. He is writhing in the bed when I initially assessed him. He states that he is in severe abdominal pain. Also endorses constant vomiting, \"I must have puked up yellow stuff 15 times in the past hour. \" RN noted that patient is no longer producing any vomit, however is having frequent vomit episodes. He states he has had some abdominal pain for \"quite some time . .. About a year\" States it is severe tonight, it has never been this bad. Patient states that in the past year, he has had difficulties eating, as it causes pain. He was placed on CM x 2. Call bell within reach.

## 2018-06-06 NOTE — ED PROVIDER NOTES
HPI Comments: 23 y.o. male with past medical history significant for h/o pneumomediastinum 05/2018 due to crack cocaine use, who presents ambulatory to the ED with chief complaint of upper abdominal pain x 4 days, worsening tonight. Pt reports that his pain has been constant for the past four days, and he made an appointment to see GI tomorrow for further evaluation. However tonight his pain became worse, and he felt like he could not wait for his appointment tomorrow so he decided to come to the ED for further evaluation. Pt additionally c/o nausea with vomiting over the past four hours. Denies dizziness, diarrhea, constipation, or headaches. He reports that he has a history of similar symptoms ~8 months ago, during which time he was evaluated and it was thought that he had a gastric ulcer. However he notes that he took Prilosec and it made his pain worse at that time. Denies taking any medications prior to arrival for treatment of the pain. Pt specifically denies any fevers, chills, cough, congestion, chest pain, shortness of breath, difficulty urinating, dysuria, back pain, neck pain, or skin rash. There are no other acute medical concerns at this time. Social hx: Positive for Tobacco use (current every day smoker); Negative for EtOH use; Positive for Illicit Drug Abuse  (h/o crack cocaine abuse, however pt states that he has not used cocaine since he was last admitted on 5/4/2018). PCP: Jayde So MD  GI: Tyrell Luis. Laurence Nichols MD    Note written by Ronda Millan, as dictated by David Romano MD 2:53 AM     The history is provided by the patient and medical records. No  was used. Past Medical History:   Diagnosis Date    Crack cocaine use     Pneumomediastinum ([address]        History reviewed. No pertinent surgical history. History reviewed. No pertinent family history.     Social History     Social History    Marital status: SINGLE     Spouse name: N/A   Ton Read Number of children: N/A    Years of education: N/A     Occupational History    Not on file. Social History Main Topics    Smoking status: Current Every Day Smoker    Smokeless tobacco: Never Used    Alcohol use Yes    Drug use: Yes     Special: Cocaine    Sexual activity: Not on file     Other Topics Concern    Not on file     Social History Narrative         ALLERGIES: Review of patient's allergies indicates no known allergies. Review of Systems   Constitutional: Negative for chills and fever. HENT: Negative for congestion. Respiratory: Negative for cough and shortness of breath. Cardiovascular: Negative for chest pain. Gastrointestinal: Positive for abdominal pain, nausea and vomiting. Negative for constipation and diarrhea. Genitourinary: Negative for difficulty urinating and dysuria. Musculoskeletal: Negative for back pain and neck pain. Skin: Negative for rash. Neurological: Negative for dizziness and headaches. All other systems reviewed and are negative. Vitals:    06/06/18 0249   BP: 152/85   Pulse: 80   Resp: 18   Temp: 98.5 °F (36.9 °C)   SpO2: 100%   Weight: 69.4 kg (153 lb)   Height: 5' 11\" (1.803 m)            Physical Exam   Nursing note and vitals reviewed. CONSTITUTIONAL: Well-appearing; well-nourished; in mild distress  HEAD: Normocephalic; atraumatic  EYES: PERRL; EOM intact; conjunctiva and sclera are clear bilaterally. ENT: No rhinorrhea; normal pharynx with no tonsillar hypertrophy; mucous membranes pink/moist, no erythema, no exudate. NECK: Supple; non-tender; no cervical lymphadenopathy  CARD: Normal S1, S2; no murmurs, rubs, or gallops. Regular rate and rhythm. RESP: Normal respiratory effort; breath sounds clear and equal bilaterally; no wheezes, rhonchi, or rales. ABD: Normal bowel sounds; non-distended; epigastric tenderness; no rebound or guarding; no palpable organomegaly, no masses, no bruits.   Back Exam: Normal inspection; no vertebral point tenderness, no CVA tenderness. Normal range of motion. EXT: Normal ROM in all four extremities; non-tender to palpation; no swelling or deformity; distal pulses are normal, no edema. SKIN: Warm; dry; no rash. NEURO:Alert and oriented x 3, coherent, MELYSSA-XII grossly intact, sensory and motor are non-focal.        MDM  Number of Diagnoses or Management Options  Diagnosis management comments: Assessment:23year-old male, who presents with epigastric abdominal pain, accompanied by intractable nausea and vomiting. Differential diagnosis consistent gastritis, GERD, hyperemesis, gravidarum, dehydration, electrolyte abnormality, and rule out bowel obstruction/ peritonitis. Plan: lab/ IV fluid/ antiemetics and analgesia/ abdominal series x-ray/ p.o. challenge/ Monitor and Reevaluate. Amount and/or Complexity of Data Reviewed  Clinical lab tests: ordered and reviewed  Tests in the radiology section of CPT®: ordered and reviewed  Tests in the medicine section of CPT®: reviewed and ordered  Discussion of test results with the performing providers: yes  Decide to obtain previous medical records or to obtain history from someone other than the patient: yes  Obtain history from someone other than the patient: yes  Review and summarize past medical records: yes  Discuss the patient with other providers: yes  Independent visualization of images, tracings, or specimens: yes    Risk of Complications, Morbidity, and/or Mortality  Presenting problems: moderate  Diagnostic procedures: moderate  Management options: moderate    Patient Progress  Patient progress: stable        ED Course       Procedures      XRAY INTERPRETATION (ED MD)  Chest Xray  No acute process seen. Normal heart size. No bony abnormalities. No infiltrate. Matias Boyce MD 4:07 AM      ABDOMINAL XRAY INTERPRETATION (ED MD)  No free air. No evidence of obstruction. No air-fluid levels.    Matias Boyce MD 4:08 AM      Progress Note:   Pt has been reexamined by Yoel Sarmiento MD. Pt is feeling much better. Symptoms have improved. All available results have been reviewed with pt and any available family. The patient was given p.o. Challenge, that he tolerated well. Pt understands sx, dx, and tx in ED. Care plan has been outlined and questions have been answered. Pt is ready to go home. Will send home on gastritis instruction. Prescription of Pepcid, Zofran, and Bentyl. Oral redehydration. education Outpatient referral with GI  as needed. Written by Yoel Sarmiento MD,4:08 AM    .   .

## 2019-01-23 ENCOUNTER — HOSPITAL ENCOUNTER (OUTPATIENT)
Dept: LAB | Age: 24
Discharge: HOME | End: 2019-01-23

## 2019-01-23 LAB
ALBUMIN SERPL-MCNC: 3.9 G/DL (ref 3.5–5)
ALBUMIN/GLOB SERPL: 1.1 {RATIO} (ref 1.1–2.2)
ALP SERPL-CCNC: 59 U/L (ref 45–117)
ALT SERPL-CCNC: 21 U/L (ref 12–78)
ANION GAP SERPL CALC-SCNC: 6 MMOL/L (ref 5–15)
AST SERPL-CCNC: 19 U/L (ref 15–37)
BASOPHILS # BLD: 0 K/UL (ref 0–0.1)
BASOPHILS NFR BLD: 0 % (ref 0–1)
BILIRUB SERPL-MCNC: 0.6 MG/DL (ref 0.2–1)
BUN SERPL-MCNC: 11 MG/DL (ref 6–20)
BUN/CREAT SERPL: 12 (ref 12–20)
CALCIUM SERPL-MCNC: 9.1 MG/DL (ref 8.5–10.1)
CHLORIDE SERPL-SCNC: 103 MMOL/L (ref 97–108)
CO2 SERPL-SCNC: 30 MMOL/L (ref 21–32)
CREAT SERPL-MCNC: 0.89 MG/DL (ref 0.7–1.3)
DIFFERENTIAL METHOD BLD: NORMAL
EOSINOPHIL # BLD: 0.1 K/UL (ref 0–0.4)
EOSINOPHIL NFR BLD: 1 % (ref 0–7)
ERYTHROCYTE [DISTWIDTH] IN BLOOD BY AUTOMATED COUNT: 14 % (ref 11.5–14.5)
GLOBULIN SER CALC-MCNC: 3.5 G/DL (ref 2–4)
GLUCOSE SERPL-MCNC: 80 MG/DL (ref 65–100)
HCT VFR BLD AUTO: 43.8 % (ref 36.6–50.3)
HGB BLD-MCNC: 13.6 G/DL (ref 12.1–17)
IMM GRANULOCYTES # BLD AUTO: 0 K/UL (ref 0–0.04)
IMM GRANULOCYTES NFR BLD AUTO: 0 % (ref 0–0.5)
LIPASE SERPL-CCNC: 60 U/L (ref 73–393)
LYMPHOCYTES # BLD: 2.4 K/UL (ref 0.8–3.5)
LYMPHOCYTES NFR BLD: 32 % (ref 12–49)
MCH RBC QN AUTO: 30.7 PG (ref 26–34)
MCHC RBC AUTO-ENTMCNC: 31.1 G/DL (ref 30–36.5)
MCV RBC AUTO: 98.9 FL (ref 80–99)
MONOCYTES # BLD: 0.7 K/UL (ref 0–1)
MONOCYTES NFR BLD: 9 % (ref 5–13)
NEUTS SEG # BLD: 4.2 K/UL (ref 1.8–8)
NEUTS SEG NFR BLD: 57 % (ref 32–75)
NRBC # BLD: 0 K/UL (ref 0–0.01)
NRBC BLD-RTO: 0 PER 100 WBC
PLATELET # BLD AUTO: 214 K/UL (ref 150–400)
PMV BLD AUTO: 9.7 FL (ref 8.9–12.9)
POTASSIUM SERPL-SCNC: 4.5 MMOL/L (ref 3.5–5.1)
PROT SERPL-MCNC: 7.4 G/DL (ref 6.4–8.2)
RBC # BLD AUTO: 4.43 M/UL (ref 4.1–5.7)
SODIUM SERPL-SCNC: 139 MMOL/L (ref 136–145)
WBC # BLD AUTO: 7.4 K/UL (ref 4.1–11.1)

## 2019-01-23 PROCEDURE — 85025 COMPLETE CBC W/AUTO DIFF WBC: CPT

## 2019-01-23 PROCEDURE — 80053 COMPREHEN METABOLIC PANEL: CPT

## 2019-01-23 PROCEDURE — 83690 ASSAY OF LIPASE: CPT

## 2019-02-28 ENCOUNTER — HOSPITAL ENCOUNTER (OUTPATIENT)
Dept: LAB | Age: 24
Discharge: HOME | End: 2019-02-28

## 2019-02-28 PROCEDURE — 87491 CHLMYD TRACH DNA AMP PROBE: CPT

## 2019-03-01 LAB
C TRACH DNA SPEC QL NAA+PROBE: NEGATIVE
N GONORRHOEA DNA SPEC QL NAA+PROBE: NEGATIVE
SAMPLE TYPE: NORMAL
SERVICE CMNT-IMP: NORMAL
SPECIMEN SOURCE: NORMAL

## 2020-09-26 ENCOUNTER — HOSPITAL ENCOUNTER (EMERGENCY)
Age: 25
Discharge: HOME | End: 2020-09-26
Attending: EMERGENCY MEDICINE | Admitting: EMERGENCY MEDICINE

## 2020-09-26 VITALS
TEMPERATURE: 98.9 F | DIASTOLIC BLOOD PRESSURE: 64 MMHG | WEIGHT: 155 LBS | SYSTOLIC BLOOD PRESSURE: 113 MMHG | RESPIRATION RATE: 16 BRPM | OXYGEN SATURATION: 97 % | HEIGHT: 72 IN | BODY MASS INDEX: 20.99 KG/M2 | HEART RATE: 65 BPM

## 2020-09-26 DIAGNOSIS — L03.116 CELLULITIS OF LEFT LOWER EXTREMITY: Primary | ICD-10-CM

## 2020-09-26 PROCEDURE — 99283 EMERGENCY DEPT VISIT LOW MDM: CPT

## 2020-09-26 PROCEDURE — 74011250637 HC RX REV CODE- 250/637: Performed by: NURSE PRACTITIONER

## 2020-09-26 RX ORDER — IBUPROFEN 800 MG/1
800 TABLET ORAL
Qty: 20 TAB | Refills: 0 | Status: SHIPPED | OUTPATIENT
Start: 2020-09-26 | End: 2020-10-03

## 2020-09-26 RX ORDER — CEPHALEXIN 500 MG/1
500 CAPSULE ORAL 4 TIMES DAILY
Qty: 28 CAP | Refills: 0 | Status: SHIPPED | OUTPATIENT
Start: 2020-09-26 | End: 2020-10-03

## 2020-09-26 RX ORDER — BACITRACIN 500 [USP'U]/G
OINTMENT TOPICAL 3 TIMES DAILY
Qty: 1 TUBE | Refills: 0 | Status: SHIPPED | OUTPATIENT
Start: 2020-09-26 | End: 2020-10-06

## 2020-09-26 RX ORDER — IBUPROFEN 400 MG/1
800 TABLET ORAL
Status: COMPLETED | OUTPATIENT
Start: 2020-09-26 | End: 2020-09-26

## 2020-09-26 RX ADMIN — IBUPROFEN 800 MG: 400 TABLET, FILM COATED ORAL at 11:45

## 2020-09-26 NOTE — ED PROVIDER NOTES
29 y/o male c/o left mid calf pain that started 2 days ago, patient states that he works in [employer], unknown bite or tick bite. Denies taking OTC medication prior to arrival.   HX tobacco user, denies any other substance. Denies fever, chills, denies fall or injury. Past Medical History:   Diagnosis Date    Crack cocaine use     Pneumomediastinum ([address]        No past surgical history on file. No family history on file. Social History     Socioeconomic History    Marital status: SINGLE     Spouse name: Not on file    Number of children: Not on file    Years of education: Not on file    Highest education level: Not on file   Occupational History    Not on file   Social Needs    Financial resource strain: Not on file    Food insecurity     Worry: Not on file     Inability: Not on file    Transportation needs     Medical: Not on file     Non-medical: Not on file   Tobacco Use    Smoking status: Current Every Day Smoker    Smokeless tobacco: Never Used   Substance and Sexual Activity    Alcohol use: Yes    Drug use: Yes     Types: Cocaine    Sexual activity: Not on file   Lifestyle    Physical activity     Days per week: Not on file     Minutes per session: Not on file    Stress: Not on file   Relationships    Social connections     Talks on phone: Not on file     Gets together: Not on file     Attends Spiritism service: Not on file     Active member of club or organization: Not on file     Attends meetings of clubs or organizations: Not on file     Relationship status: Not on file    Intimate partner violence     Fear of current or ex partner: Not on file     Emotionally abused: Not on file     Physically abused: Not on file     Forced sexual activity: Not on file   Other Topics Concern    Not on file   Social History Narrative    Not on file         ALLERGIES: Patient has no known allergies. Review of Systems   Constitutional: Negative for chills and fever. Musculoskeletal: Positive for arthralgias and myalgias. Skin: Positive for color change and wound. 4 cm x 4 cm possible bite wound, mid left calf. Vitals:    09/26/20 1053   BP: 138/75   Pulse: 77   Resp: 16   Temp: 98.9 °F (37.2 °C)   SpO2: 97%   Weight: 70.3 kg (155 lb)   Height: 6' (1.829 m)            Physical Exam  Vitals signs and nursing note reviewed. Constitutional:       Appearance: Normal appearance. HENT:      Head: Normocephalic. Nose: Nose normal.   Neck:      Musculoskeletal: Normal range of motion. Cardiovascular:      Rate and Rhythm: Normal rate. Pulmonary:      Effort: Pulmonary effort is normal. No respiratory distress. Abdominal:      General: There is no distension. Musculoskeletal: Normal range of motion. Left lower leg: He exhibits tenderness and swelling. He exhibits no bony tenderness, no deformity and no laceration. No edema. Legs:    Skin:     General: Skin is warm and dry. Capillary Refill: Capillary refill takes less than 2 seconds. Neurological:      Mental Status: He is alert and oriented to person, place, and time. Psychiatric:         Mood and Affect: Mood normal.          MDM  Number of Diagnoses or Management Options  Cellulitis of left lower extremity:   Diagnosis management comments: Possible: cellulitis vs. insect bite wound vs. contact dermatitis vs. Abscess  Plan: ultrasound left calf    VITAL SIGNS:  Patient Vitals for the past 4 hrs:   Temp Pulse Resp BP SpO2   09/26/20 1053 98.9 °F (37.2 °C) 77 16 138/75 97 %         LABS:  No results found for this or any previous visit (from the past 6 hour(s)). IMAGING:  No orders to display         Medications During Visit:  Medications   ibuprofen (MOTRIN) tablet 800 mg (800 mg Oral Given 9/26/20 1145)         DECISION MAKING:  Madelin Block is a 30 y.o. male who comes in as above.    Patient walking on the tip of his left foot due to pain in the posterior calf from wound x 2 days.   Everyday tobacco user. Dr Marisa Nino at chairside with ultrasound, no abscess appreciated that is drain able. Will treat for cellulitis to the LLE with Keflex and RX for Ibuprofen, strict return for worsening of symptoms, strong 2+ DP LLE, capillary refill less than 2 seconds. Patient verbalizes understanding and agrees with plan. IMPRESSION:  1. Cellulitis of left lower extremity        DISPOSITION:  Discharged      Current Discharge Medication List      START taking these medications    Details   cephALEXin (Keflex) 500 mg capsule Take 1 Cap by mouth four (4) times daily for 7 days. Qty: 28 Cap, Refills: 0      ibuprofen (MOTRIN) 800 mg tablet Take 1 Tab by mouth every six (6) hours as needed for Pain for up to 7 days. Qty: 20 Tab, Refills: 0      bacitracin (BACITRACIN) 500 unit/gram oint Apply  to affected area three (3) times daily for 10 days. Apply to affected area left lower leg. Qty: 1 Tube, Refills: 0              Follow-up Information     Follow up With Specialties Details Why Contact Info    [address], [facility] Emergency Medicine  If symptoms worsen [address] [place] [address]  [phone]            The patient is asked to follow-up with their primary care provider in the next several days. They are to call tomorrow for an appointment. The patient is asked to return promptly for any increased concerns or worsening of symptoms. They can return to this emergency department or any other emergency department. Procedures    Discussed patient care with Eveline Gallagher MD. Attending was given the opportunity to see and evaluate the patient. Agrees with care plan as discussed.   Myla Munguia NP  11:27 AM

## 2020-09-26 NOTE — ED TRIAGE NOTES
Arrives ambulatory for c/o left posterior lower leg wound that's scabbed over with surrounding redness. Denies fevers.

## 2020-09-26 NOTE — DISCHARGE INSTRUCTIONS
Any increased redness, streaking, fever or chills return to the ED immediately. Please take ALL antibiotics completely, alternate Tylenol and Ibuprofen as needed for pain control.

## 2021-04-09 ENCOUNTER — HOSPITAL ENCOUNTER (EMERGENCY)
Age: 26
Discharge: HOME | End: 2021-04-09
Attending: EMERGENCY MEDICINE

## 2021-04-09 VITALS
DIASTOLIC BLOOD PRESSURE: 67 MMHG | HEART RATE: 65 BPM | TEMPERATURE: 97.9 F | RESPIRATION RATE: 16 BRPM | OXYGEN SATURATION: 99 % | SYSTOLIC BLOOD PRESSURE: 130 MMHG

## 2021-04-09 DIAGNOSIS — R21 RASH: Primary | ICD-10-CM

## 2021-04-09 PROCEDURE — 99282 EMERGENCY DEPT VISIT SF MDM: CPT

## 2021-04-09 RX ORDER — DOXYCYCLINE HYCLATE 100 MG
100 TABLET ORAL 2 TIMES DAILY
Qty: 28 TAB | Refills: 0 | Status: SHIPPED | OUTPATIENT
Start: 2021-04-09 | End: 2021-04-23

## 2021-04-09 NOTE — ED TRIAGE NOTES
Pt arrives ambulatory from home with CC of a rash \"all over\". Pt has scabs on BUE and BLE. Pt denies drug or alcohol use.

## 2021-04-10 NOTE — ED PROVIDER NOTES
History of illicit drug use, MRSA. He presents with complaints of a truncal and extremity rash, and he is concerned about MRSA. He has scattered, erythematous lesions on his arms, legs, chest, and abdomen. He states that he has been having a hard time \"not picking at them. \"  He denies fever or other systemic symptoms. No treatment prior to arrival.           Past Medical History:   Diagnosis Date    Crack cocaine use     [address]omed[address]um ([facility] [address]        No past surgical history on file. History reviewed. No pertinent family history. Social History     Socioeconomic History    Marital status: SINGLE     Spouse name: Not on file    Number of children: Not on file    Years of education: Not on file    Highest education level: Not on file   Occupational History    Not on file   Social Needs    Financial resource strain: Not on file    Food insecurity     Worry: Not on file     Inability: Not on file    Transportation needs     Medical: Not on file     Non-medical: Not on file   Tobacco Use    Smoking status: Current Every Day Smoker    Smokeless tobacco: Never Used   Substance and Sexual Activity    Alcohol use:  Yes    Drug use: Yes     Types: Cocaine    Sexual activity: Not on file   Lifestyle    Physical activity     Days per week: Not on file     Minutes per session: Not on file    Stress: Not on file   Relationships    Social connections     Talks on phone: Not on file     Gets together: Not on file     Attends Religion service: Not on file     Active member of club or organization: Not on file     Attends meetings of clubs or organizations: Not on file     Relationship status: Not on file    Intimate partner violence     Fear of current or ex partner: Not on file     Emotionally abused: Not on file     Physically abused: Not on file     Forced sexual activity: Not on file   Other Topics Concern    Not on file   Social History Narrative    Not on file         ALLERGIES: Patient has no known allergies. Review of Systems   All other systems reviewed and are negative. Vitals:    04/09/21 0631   BP: 130/67   Pulse: 65   Resp: 16   Temp: 97.9 °F (36.6 °C)   SpO2: 99%            Physical Exam  Vitals signs and nursing note reviewed. Constitutional:       Appearance: He is well-developed. HENT:      Head: Normocephalic and atraumatic. Eyes:      Conjunctiva/sclera: Conjunctivae normal.   Neck:      Trachea: No tracheal deviation. Cardiovascular:      Rate and Rhythm: Normal rate. Pulmonary:      Effort: Pulmonary effort is normal.   Abdominal:      General: There is no distension. Skin:     General: Skin is dry. Comments: Multiple, scattered, round, erythematous lesions noted on his extremities and trunk. They have scabs over top of them. Neurological:      Mental Status: He is alert. MDM       Procedures    Assessment/plan: Rash -concerning for staph infection. No systemic symptoms. Reassuring appearance/exam with stable vital signs. Home with doxycycline. Return precautions discussed.   Clare Mendieta MD

## 2022-01-24 ENCOUNTER — HOSPITAL ENCOUNTER (EMERGENCY)
Age: 27
Discharge: LEFT BEFORE DISCHARGE | End: 2022-01-24
Attending: STUDENT IN AN ORGANIZED HEALTH CARE EDUCATION/TRAINING PROGRAM
Payer: MEDICAID

## 2022-01-24 VITALS
RESPIRATION RATE: 16 BRPM | HEIGHT: 72 IN | OXYGEN SATURATION: 99 % | TEMPERATURE: 98.3 F | BODY MASS INDEX: 21.53 KG/M2 | SYSTOLIC BLOOD PRESSURE: 101 MMHG | WEIGHT: 158.95 LBS | HEART RATE: 64 BPM | DIASTOLIC BLOOD PRESSURE: 76 MMHG

## 2022-01-24 DIAGNOSIS — L73.9 FOLLICULITIS: Primary | ICD-10-CM

## 2022-01-24 DIAGNOSIS — L01.00 IMPETIGO: ICD-10-CM

## 2022-01-24 PROCEDURE — 99282 EMERGENCY DEPT VISIT SF MDM: CPT

## 2022-01-24 RX ORDER — CEPHALEXIN 500 MG/1
500 CAPSULE ORAL 4 TIMES DAILY
Qty: 28 CAPSULE | Refills: 0 | Status: SHIPPED | OUTPATIENT
Start: 2022-01-24 | End: 2022-01-31

## 2022-01-24 RX ORDER — MUPIROCIN 20 MG/G
OINTMENT TOPICAL DAILY
Qty: 22 G | Refills: 1 | Status: SHIPPED | OUTPATIENT
Start: 2022-01-24 | End: 2022-03-09 | Stop reason: SDUPTHER

## 2022-01-24 RX ORDER — SULFAMETHOXAZOLE AND TRIMETHOPRIM 800; 160 MG/1; MG/1
1 TABLET ORAL 2 TIMES DAILY
Qty: 14 TABLET | Refills: 0 | Status: SHIPPED | OUTPATIENT
Start: 2022-01-24 | End: 2022-01-31

## 2022-01-24 NOTE — DISCHARGE INSTRUCTIONS
Emergency [address] by [facility]  [address], [address]  Open M, W, F: 8AM - 5PM and T, Th: 8AM-6PM  Phone: [phone], press 4  $70 for Emergency Care  $60 for first routine care, then pay by sliding scale based upon income. [facility]  [address]  Phone: [phone]    [address] Dentistry  [address] [place] [address], [address]  Phone: [phone]  Fee: $75 Routine Extraction, $125 Complex Extraction  Open Monday - Friday 8AM - 5:00PM    [facility]  [address] [facility], [address]  Open Monday - Friday 8AM - 4:30 PM  Phone: [phone] of Dentistry Urgent [address] [place] [address] Dentistry, [address] [place] [address], 1st Floor  [facility] starting at 8:30 AM M-F  Phone: [phone], press 2  Fee: $150 per tooth (x-ray & extractions only)  Pediatrics Phone[de-identified] [phone], 8-5 M-F    [address] Dentistry, [address] [place] [address], 2nd Floor, [address] [place] [address] starting at 8:30 AM - 3 PM [address] [place] [address], [address] [place] [address]  Phone: [phone] or [phone]  Emergency Hours: 9:30AM - 11AM (extractions)  Simple tooth extraction $ per tooth. #75 for x-ray    [facility] Residents only, over the age of 25  Phone: [phone]. Leave message saying you need an appointment to register.   Hours: Tuesday Evenings

## 2022-01-24 NOTE — ED PROVIDER NOTES
EMERGENCY DEPARTMENT HISTORY AND PHYSICAL EXAM      Date: 1/24/2022  Patient Name: Merline Beecham    History of Presenting Illness     Chief Complaint   Patient presents with    Skin Problem     pt with hx of mrsa seems to be having an exacerbation with multiple sores, some of which are draining pus       History Provided By: Patient and Patient's Father    HPI: Merline Beecham, 27 y.o. male with PMHx of drug abuse, MRSA, presents to the ED with cc of generalized rash worsening over the last 1 to 2 weeks. Patient has multiple sores scattered to his arms, face, distal lower extremities. Patient admits to picking at them frequently and says he cannot help it. He denies fevers, chest pain, shortness of breath, nausea/vomiting. There are no other complaints, changes, or physical findings at this time. PCP: Judah, MD Jayde    No current facility-administered medications on file prior to encounter. Current Outpatient Medications on File Prior to Encounter   Medication Sig Dispense Refill    famotidine (PEPCID) 20 mg tablet Take 1 Tab by mouth two (2) times a day. 60 Tab 0    ondansetron (ZOFRAN ODT) 8 mg disintegrating tablet Take 1 Tab by mouth every eight (8) hours as needed for Nausea. 15 Tab 0    levoFLOXacin (LEVAQUIN) 750 mg tablet Take 1 Tab by mouth daily. 3 Tab 0       Past History     Past Medical History:  Past Medical History:   Diagnosis Date    Crack cocaine use     Pneumomediastinum ([address]        Past Surgical History:  No past surgical history on file. Family History:  No family history on file. Social History:  Social History     Tobacco Use    Smoking status: Current Every Day Smoker    Smokeless tobacco: Never Used   Substance Use Topics    Alcohol use: Yes    Drug use: Yes     Types: Cocaine       Allergies:  No Known Allergies      Review of Systems   Review of Systems   Constitutional: Negative for fever. Respiratory: Negative for shortness of breath.     Cardiovascular: Negative for chest pain. Gastrointestinal: Negative for nausea and vomiting. Skin: Positive for rash and wound. Physical Exam   Physical Exam  Vitals and nursing note reviewed. Constitutional:       General: He is not in acute distress. Appearance: Normal appearance. HENT:      Head: Normocephalic and atraumatic. Mouth/Throat:      Mouth: Mucous membranes are moist.      Comments: There are patches of honey crusted lesions over the pt's chin and anterior to the patient's right ear. Very poor dentition with multiple caries  Eyes:      Extraocular Movements: Extraocular movements intact. Conjunctiva/sclera: Conjunctivae normal.   Neck:      Trachea: Phonation normal.   Pulmonary:      Effort: Pulmonary effort is normal.   Musculoskeletal:         General: Normal range of motion. Cervical back: Normal range of motion and neck supple. Skin:     General: Skin is warm and dry. Comments: Scattered scabbed over ulcerations to bilateral arms and distal lower extremities. Scabs are in multiple stages of wound healing. Patient is observed itching lesions throughout encounter. Neurological:      General: No focal deficit present. Mental Status: He is alert and oriented to person, place, and time. GCS: GCS eye subscore is 4. GCS verbal subscore is 5. GCS motor subscore is 6. Psychiatric:         Mood and Affect: Mood normal.         Behavior: Behavior normal.         Diagnostic Study Results     Labs -   No results found for this or any previous visit (from the past 12 hour(s)). Radiologic Studies -   No orders to display     CT Results  (Last 48 hours)    None        CXR Results  (Last 48 hours)    None            Medical Decision Making   I am the first provider for this patient. I reviewed the vital signs, available nursing notes, past medical history, past surgical history, family history and social history.     Vital Signs-Reviewed the patient's vital signs. Patient Vitals for the past 12 hrs:   Temp Pulse Resp BP SpO2   01/24/22 1145 98.3 °F (36.8 °C) 64 16 101/76 99 %       Records Reviewed: Nursing Notes and Old Medical Records    Provider Notes (Medical Decision Making):   Appearance of rash consistent with MRSA, impetigo to face. Patient has no signs/symptoms of systemic illness. He is agreeable to discharge with course of Keflex, Bactrim, topical mupirocin. Advised on adherence and use. Follow-up with PCP. Referral to low-cost dental clinics also provided. Patient is in agreement this plan. ED Course:   Initial assessment performed. The patients presenting problems have been discussed, and they are in agreement with the care plan formulated and outlined with them. I have encouraged them to ask questions as they arise throughout their visit. Critical Care Time: None    Disposition:  D/c    PLAN:  1. Current Discharge Medication List      START taking these medications    Details   cephALEXin (Keflex) 500 mg capsule Take 1 Capsule by mouth four (4) times daily for 7 days. Qty: 28 Capsule, Refills: 0  Start date: 1/24/2022, End date: 1/31/2022      trimethoprim-sulfamethoxazole (Bactrim DS) 160-800 mg per tablet Take 1 Tablet by mouth two (2) times a day for 7 days. Qty: 14 Tablet, Refills: 0  Start date: 1/24/2022, End date: 1/31/2022      mupirocin (BACTROBAN) 2 % ointment Apply  to affected area daily. Qty: 22 g, Refills: 1  Start date: 1/24/2022           2. Follow-up Information     Follow up With Specialties Details Why Contact Info    [facility] EMERGENCY DEPT Emergency Medicine  As needed, If symptoms worsen [address]  [address]  [phone]    Your PCP  Call  For follow up         Return to ED if worse     Diagnosis     Clinical Impression:   1. Folliculitis    2. Impetigo          Please note that this dictation was completed with [employer] the [employer] voice recognition software.  Quite often unanticipated grammatical, syntax, homophones, and other interpretive errors are inadvertently transcribed by the computer software. Please disregards these errors. Please excuse any errors that have escaped final proofreading.

## 2022-03-09 ENCOUNTER — HOSPITAL ENCOUNTER (EMERGENCY)
Age: 27
Discharge: HOME | End: 2022-03-09
Attending: STUDENT IN AN ORGANIZED HEALTH CARE EDUCATION/TRAINING PROGRAM
Payer: COMMERCIAL

## 2022-03-09 VITALS
DIASTOLIC BLOOD PRESSURE: 66 MMHG | BODY MASS INDEX: 22.54 KG/M2 | SYSTOLIC BLOOD PRESSURE: 152 MMHG | HEART RATE: 72 BPM | WEIGHT: 166.45 LBS | HEIGHT: 72 IN | OXYGEN SATURATION: 100 % | TEMPERATURE: 98.1 F | RESPIRATION RATE: 16 BRPM

## 2022-03-09 DIAGNOSIS — A49.02 MRSA (METHICILLIN RESISTANT STAPHYLOCOCCUS AUREUS): Primary | ICD-10-CM

## 2022-03-09 PROCEDURE — 99283 EMERGENCY DEPT VISIT LOW MDM: CPT

## 2022-03-09 RX ORDER — SULFAMETHOXAZOLE AND TRIMETHOPRIM 800; 160 MG/1; MG/1
1 TABLET ORAL 2 TIMES DAILY
Qty: 20 TABLET | Refills: 0 | Status: SHIPPED | OUTPATIENT
Start: 2022-03-09 | End: 2022-03-19

## 2022-03-09 RX ORDER — MUPIROCIN 20 MG/G
OINTMENT TOPICAL DAILY
Qty: 22 G | Refills: 1 | Status: SHIPPED | OUTPATIENT
Start: 2022-03-09 | End: 2022-03-30

## 2022-03-09 NOTE — ED PROVIDER NOTES
EMERGENCY DEPARTMENT HISTORY AND PHYSICAL EXAM      Date: 3/9/2022  Patient Name: Ricco Huston    History of Presenting Illness     Chief Complaint   Patient presents with    Skin Infection     Red, dry areas on right arm, lesions also noted on face. Hx of MRSA infection, states he did not complete his last AB    Dental Pain     Reports chronic dental pain and infection       History Provided By: Patient    HPI: Ricco Huston, 27 y.o. male with significant PMHx for drug addiction, presents by POV to the ED with cc of sores to his face and arms. He notes a history of MRSA, which is consistent with his current rash. He started to break out about 3 weeks ago. Of note he had a similar rash about 2 months ago and was prescribed an antibiotic. He did not complete this antibiotic course. Noting that he stopped it once the rash was better. He denies fever, chills, drainage from the wounds, nausea, vomiting. He also notes a history of chronic dental pain that is unchanged from baseline. There are no upper respiratory complaints. There is been no treatment prior to arrival.  He notes that Bactrim and topical antibiotic ointment has worked well in the past.  The rash is not painful or itchy. There are no other complaints, changes, or physical findings at this time. Social Hx: Tobacco (denies), EtOH (denies), Illicit drug use (amphetamines occasionally; otherwise clean for several months)     PCP: Jayde So MD    No current facility-administered medications on file prior to encounter. Current Outpatient Medications on File Prior to Encounter   Medication Sig Dispense Refill    [DISCONTINUED] mupirocin (BACTROBAN) 2 % ointment Apply  to affected area daily. 22 g 1    famotidine (PEPCID) 20 mg tablet Take 1 Tab by mouth two (2) times a day. 60 Tab 0    [DISCONTINUED] ondansetron (ZOFRAN ODT) 8 mg disintegrating tablet Take 1 Tab by mouth every eight (8) hours as needed for Nausea.  15 Tab 0    [DISCONTINUED] levoFLOXacin (LEVAQUIN) 750 mg tablet Take 1 Tab by mouth daily. 3 Tab 0       Past History     Past Medical History:  Past Medical History:   Diagnosis Date    Crack cocaine use     Pneumomediastinum ([address]        Past Surgical History:  No past surgical history on file. Family History:  No family history on file. Social History:  Social History     Tobacco Use    Smoking status: Current Every Day Smoker    Smokeless tobacco: Never Used   Substance Use Topics    Alcohol use: Yes    Drug use: Yes     Types: Cocaine       Allergies:  No Known Allergies      Review of Systems   Review of Systems   Constitutional: Negative for chills, diaphoresis and fever. HENT: Negative for congestion, ear pain, rhinorrhea and sore throat. Respiratory: Negative for cough and shortness of breath. Cardiovascular: Negative for chest pain. Gastrointestinal: Negative for abdominal pain, constipation, diarrhea, nausea and vomiting. Genitourinary: Negative for difficulty urinating, dysuria, frequency and hematuria. Musculoskeletal: Negative for arthralgias and myalgias. Skin: Positive for rash. Neurological: Negative for headaches. All other systems reviewed and are negative. Physical Exam   Physical Exam  Vitals and nursing note reviewed. Constitutional:       General: He is not in acute distress. Appearance: He is well-developed. He is not diaphoretic. Comments: 27 y.o.  male    HENT:      Head: Normocephalic and atraumatic. Right Ear: Tympanic membrane normal.      Left Ear: Tympanic membrane normal.      Mouth/Throat:      Mouth: Mucous membranes are moist.      Comments: Very poor dental hygiene with multiple caries noted. Eyes:      General:         Right eye: No discharge. Left eye: No discharge. Conjunctiva/sclera: Conjunctivae normal.   Cardiovascular:      Rate and Rhythm: Normal rate and regular rhythm.       Heart sounds: Normal heart sounds. No murmur heard. Pulmonary:      Effort: Pulmonary effort is normal. No respiratory distress. Breath sounds: Normal breath sounds. Musculoskeletal:      Cervical back: Normal range of motion and neck supple. Skin:     General: Skin is warm and dry. Findings: Rash present. Comments: Ulcerative lesions to bilateral forearms and jaw. Neurological:      Mental Status: He is alert and oriented to person, place, and time. Psychiatric:         Behavior: Behavior normal.         Diagnostic Study Results     Labs - none    Radiologic Studies - none    Medical Decision Making   I am the first provider for this patient. I reviewed the vital signs, available nursing notes, past medical history, past surgical history, family history and social history. Vital Signs-Reviewed the patient's vital signs. Patient Vitals for the past 12 hrs:   Temp Pulse Resp BP SpO2   03/09/22 1018 98.1 °F (36.7 °C) 72 16 (!) 152/66 100 %       Records Reviewed: Nursing Notes and Old Medical Records    Provider Notes (Medical Decision Making):   Patient presents ED with stable vital signs for rash and dental pain. Differential diagnosis include but are not limited to MRSA, dermatitis, eczema, neurotic itching. We will treat patient with oral antibiotics and topical antibiotics as this has worked well in the past.  He should follow-up with primary care medicine but can return to the ED for deterioration. ED Course:   Initial assessment performed. The patients presenting problems have been discussed, and they are in agreement with the care plan formulated and outlined with them. I have encouraged them to ask questions as they arise throughout their visit. Critical Care Time: None    Disposition:  DISCHARGE NOTE:  10:56 AM  The pt is ready for discharge. The pt's signs, symptoms, diagnosis, and discharge instructions have been discussed and pt has conveyed their understanding.  The pt is to follow up as recommended or return to ER should their symptoms worsen. Plan has been discussed and pt is in agreement. PLAN:  1. Current Discharge Medication List      START taking these medications    Details   trimethoprim-sulfamethoxazole (Bactrim DS) 160-800 mg per tablet Take 1 Tablet by mouth two (2) times a day for 10 days. Qty: 20 Tablet, Refills: 0  Start date: 3/9/2022, End date: 3/19/2022         CONTINUE these medications which have CHANGED    Details   mupirocin (BACTROBAN) 2 % ointment Apply  to affected area daily. Qty: 22 g, Refills: 1  Start date: 3/9/2022           2. Follow-up Information     Follow up With Specialties Details Why Contact Info    Your PCP  In 1 week As needed, If not improved     [facility] EMERGENCY DE[facility] Emergency Medicine  If symptoms worsen [address] [place] [address]  [phone]        Return to ED if worse     Diagnosis     Clinical Impression:   1. MRSA (methicillin resistant Staphylococcus aureus)          Please note that this dictation was completed with [employer], the computer voice recognition software. Quite often unanticipated grammatical, syntax, homophones, and other interpretive errors are inadvertently transcribed by the computer software. Please disregards these errors. Please excuse any errors that have escaped final proofreading.

## 2022-03-09 NOTE — ED NOTES
Pt arrives ambulatory from home. Pt presents with cc of red sores on his right hand and arm. Pt noted to have sores on knuckles as well. Pt believes this is a MRSA rash, states he has had MRSA rash in the past. Pt also saying he does pick at the sores at times because they \"bother him\", they are not itchy. Pt is currently a participant of [facility], has been actively attending for 3 months. Pt currently taking suboxone. Pt denies any drug use in past 3 months, states he has never used IV drugs. Pt also complaiing of dental pain, states he has chronic dental issues and has been told he needs to have several teeth pulled. Pt does not have any active dental appointments. Pt denies fevers at home. Pt states he was treated with PO abx for previous MRSA infection, however he did not finish the course. Pt states he stopped taking prescription when the sores cleared up.

## 2022-03-09 NOTE — DISCHARGE INSTRUCTIONS
It was a pleasure taking care of you at [facility] today. We know that when you come to [facility], you are entrusting us with your health, comfort, and safety. Our physicians and nurses honor that trust, and we truly appreciate the opportunity to care for you and your loved ones. We also value your feedback. If you receive a survey about your Emergency Department experience today, please fill it out. We care about our patients' feedback, and we listen to what you have to say. Thank you!

## 2022-03-24 ENCOUNTER — HOSPITAL ENCOUNTER (INPATIENT)
Age: 27
LOS: 6 days | Discharge: HOME | DRG: 751 | End: 2022-03-30
Attending: EMERGENCY MEDICINE | Admitting: PSYCHIATRY & NEUROLOGY
Payer: COMMERCIAL

## 2022-03-24 DIAGNOSIS — R45.851 SUICIDAL IDEATIONS: Primary | ICD-10-CM

## 2022-03-24 PROBLEM — F29 PSYCHOSIS (HCC): Status: ACTIVE | Noted: 2022-03-24

## 2022-03-24 LAB
ALBUMIN SERPL-MCNC: 3.6 G/DL (ref 3.5–5)
ALBUMIN/GLOB SERPL: 1 {RATIO} (ref 1.1–2.2)
ALP SERPL-CCNC: 69 U/L (ref 45–117)
ALT SERPL-CCNC: 18 U/L (ref 12–78)
AMPHET UR QL SCN: POSITIVE
ANION GAP SERPL CALC-SCNC: 4 MMOL/L (ref 5–15)
APPEARANCE UR: CLEAR
AST SERPL-CCNC: 12 U/L (ref 15–37)
BACTERIA URNS QL MICRO: NEGATIVE /HPF
BARBITURATES UR QL SCN: NEGATIVE
BASOPHILS # BLD: 0 K/UL (ref 0–0.1)
BASOPHILS NFR BLD: 1 % (ref 0–1)
BENZODIAZ UR QL: NEGATIVE
BILIRUB SERPL-MCNC: 0.3 MG/DL (ref 0.2–1)
BILIRUB UR QL: NEGATIVE
BUN SERPL-MCNC: 15 MG/DL (ref 6–20)
BUN/CREAT SERPL: 17 (ref 12–20)
CALCIUM SERPL-MCNC: 9 MG/DL (ref 8.5–10.1)
CANNABINOIDS UR QL SCN: NEGATIVE
CHLORIDE SERPL-SCNC: 104 MMOL/L (ref 97–108)
CO2 SERPL-SCNC: 28 MMOL/L (ref 21–32)
COCAINE UR QL SCN: NEGATIVE
COLOR UR: NORMAL
CREAT SERPL-MCNC: 0.87 MG/DL (ref 0.7–1.3)
DIFFERENTIAL METHOD BLD: ABNORMAL
DRUG SCRN COMMENT,DRGCM: ABNORMAL
EOSINOPHIL # BLD: 0.2 K/UL (ref 0–0.4)
EOSINOPHIL NFR BLD: 3 % (ref 0–7)
EPITH CASTS URNS QL MICRO: NORMAL /LPF
ERYTHROCYTE [DISTWIDTH] IN BLOOD BY AUTOMATED COUNT: 14.1 % (ref 11.5–14.5)
ETHANOL SERPL-MCNC: <10 MG/DL
FLUAV RNA SPEC QL NAA+PROBE: NOT DETECTED
FLUBV RNA SPEC QL NAA+PROBE: NOT DETECTED
GLOBULIN SER CALC-MCNC: 3.6 G/DL (ref 2–4)
GLUCOSE SERPL-MCNC: 108 MG/DL (ref 65–100)
GLUCOSE UR STRIP.AUTO-MCNC: NEGATIVE MG/DL
HCT VFR BLD AUTO: 43.6 % (ref 36.6–50.3)
HGB BLD-MCNC: 14.5 G/DL (ref 12.1–17)
HGB UR QL STRIP: NEGATIVE
HYALINE CASTS URNS QL MICRO: NORMAL /LPF (ref 0–5)
IMM GRANULOCYTES # BLD AUTO: 0 K/UL (ref 0–0.04)
IMM GRANULOCYTES NFR BLD AUTO: 0 % (ref 0–0.5)
KETONES UR QL STRIP.AUTO: NEGATIVE MG/DL
LEUKOCYTE ESTERASE UR QL STRIP.AUTO: NEGATIVE
LYMPHOCYTES # BLD: 2.7 K/UL (ref 0.8–3.5)
LYMPHOCYTES NFR BLD: 33 % (ref 12–49)
MCH RBC QN AUTO: 29.8 PG (ref 26–34)
MCHC RBC AUTO-ENTMCNC: 33.3 G/DL (ref 30–36.5)
MCV RBC AUTO: 89.7 FL (ref 80–99)
METHADONE UR QL: NEGATIVE
MONOCYTES # BLD: 0.5 K/UL (ref 0–1)
MONOCYTES NFR BLD: 6 % (ref 5–13)
NEUTS SEG # BLD: 4.6 K/UL (ref 1.8–8)
NEUTS SEG NFR BLD: 57 % (ref 32–75)
NITRITE UR QL STRIP.AUTO: NEGATIVE
NRBC # BLD: 0 K/UL (ref 0–0.01)
NRBC BLD-RTO: 0 PER 100 WBC
OPIATES UR QL: NEGATIVE
PCP UR QL: NEGATIVE
PH UR STRIP: 7 [PH] (ref 5–8)
PLATELET # BLD AUTO: 269 K/UL (ref 150–400)
PMV BLD AUTO: 8.5 FL (ref 8.9–12.9)
POTASSIUM SERPL-SCNC: 3.9 MMOL/L (ref 3.5–5.1)
PROT SERPL-MCNC: 7.2 G/DL (ref 6.4–8.2)
PROT UR STRIP-MCNC: NEGATIVE MG/DL
RBC # BLD AUTO: 4.86 M/UL (ref 4.1–5.7)
RBC #/AREA URNS HPF: NORMAL /HPF (ref 0–5)
SARS-COV-2, COV2: NOT DETECTED
SODIUM SERPL-SCNC: 136 MMOL/L (ref 136–145)
SP GR UR REFRACTOMETRY: 1.02 (ref 1–1.03)
UR CULT HOLD, URHOLD: NORMAL
UROBILINOGEN UR QL STRIP.AUTO: 0.2 EU/DL (ref 0.2–1)
WBC # BLD AUTO: 8 K/UL (ref 4.1–11.1)
WBC URNS QL MICRO: NORMAL /HPF (ref 0–4)

## 2022-03-24 PROCEDURE — 81001 URINALYSIS AUTO W/SCOPE: CPT

## 2022-03-24 PROCEDURE — 84443 ASSAY THYROID STIM HORMONE: CPT

## 2022-03-24 PROCEDURE — 80053 COMPREHEN METABOLIC PANEL: CPT

## 2022-03-24 PROCEDURE — 65220000003 HC RM SEMIPRIVATE PSYCH

## 2022-03-24 PROCEDURE — 90791 PSYCH DIAGNOSTIC EVALUATION: CPT

## 2022-03-24 PROCEDURE — 87636 SARSCOV2 & INF A&B AMP PRB: CPT

## 2022-03-24 PROCEDURE — 74011250636 HC RX REV CODE- 250/636: Performed by: NURSE PRACTITIONER

## 2022-03-24 PROCEDURE — 36415 COLL VENOUS BLD VENIPUNCTURE: CPT

## 2022-03-24 PROCEDURE — 99285 EMERGENCY DEPT VISIT HI MDM: CPT

## 2022-03-24 PROCEDURE — 82077 ASSAY SPEC XCP UR&BREATH IA: CPT

## 2022-03-24 PROCEDURE — 85025 COMPLETE CBC W/AUTO DIFF WBC: CPT

## 2022-03-24 PROCEDURE — 80307 DRUG TEST PRSMV CHEM ANLYZR: CPT

## 2022-03-24 RX ORDER — ACETAMINOPHEN 325 MG/1
650 TABLET ORAL
Status: DISCONTINUED | OUTPATIENT
Start: 2022-03-24 | End: 2022-03-30 | Stop reason: HOSPADM

## 2022-03-24 RX ORDER — IBUPROFEN 400 MG/1
400 TABLET ORAL
Status: DISCONTINUED | OUTPATIENT
Start: 2022-03-24 | End: 2022-03-30 | Stop reason: HOSPADM

## 2022-03-24 RX ORDER — LORAZEPAM 2 MG/ML
INJECTION INTRAMUSCULAR
Status: DISCONTINUED
Start: 2022-03-24 | End: 2022-03-24 | Stop reason: WASHOUT

## 2022-03-24 RX ORDER — LORAZEPAM 2 MG/ML
1 INJECTION INTRAMUSCULAR
Status: DISCONTINUED | OUTPATIENT
Start: 2022-03-24 | End: 2022-03-30 | Stop reason: HOSPADM

## 2022-03-24 RX ORDER — HALOPERIDOL 5 MG/ML
INJECTION INTRAMUSCULAR
Status: DISCONTINUED
Start: 2022-03-24 | End: 2022-03-24 | Stop reason: WASHOUT

## 2022-03-24 RX ORDER — HYDROXYZINE 50 MG/1
50 TABLET, FILM COATED ORAL
Status: DISCONTINUED | OUTPATIENT
Start: 2022-03-24 | End: 2022-03-30 | Stop reason: HOSPADM

## 2022-03-24 RX ORDER — OLANZAPINE 5 MG/1
5 TABLET ORAL
Status: DISCONTINUED | OUTPATIENT
Start: 2022-03-24 | End: 2022-03-30 | Stop reason: HOSPADM

## 2022-03-24 RX ORDER — BENZTROPINE MESYLATE 1 MG/1
1 TABLET ORAL
Status: DISCONTINUED | OUTPATIENT
Start: 2022-03-24 | End: 2022-03-30 | Stop reason: HOSPADM

## 2022-03-24 RX ORDER — ADHESIVE BANDAGE
30 BANDAGE TOPICAL DAILY PRN
Status: DISCONTINUED | OUTPATIENT
Start: 2022-03-24 | End: 2022-03-30 | Stop reason: HOSPADM

## 2022-03-24 RX ORDER — DIPHENHYDRAMINE HYDROCHLORIDE 50 MG/ML
50 INJECTION, SOLUTION INTRAMUSCULAR; INTRAVENOUS
Status: DISCONTINUED | OUTPATIENT
Start: 2022-03-24 | End: 2022-03-30 | Stop reason: HOSPADM

## 2022-03-24 RX ORDER — HALOPERIDOL 5 MG/ML
5 INJECTION INTRAMUSCULAR
Status: DISCONTINUED | OUTPATIENT
Start: 2022-03-24 | End: 2022-03-30 | Stop reason: HOSPADM

## 2022-03-24 RX ORDER — TRAZODONE HYDROCHLORIDE 50 MG/1
50 TABLET ORAL
Status: DISCONTINUED | OUTPATIENT
Start: 2022-03-24 | End: 2022-03-25

## 2022-03-24 RX ADMIN — LORAZEPAM 1 MG: 2 INJECTION INTRAMUSCULAR; INTRAVENOUS at 16:29

## 2022-03-24 RX ADMIN — DIPHENHYDRAMINE HYDROCHLORIDE 50 MG: 50 INJECTION INTRAMUSCULAR; INTRAVENOUS at 16:27

## 2022-03-24 RX ADMIN — HALOPERIDOL LACTATE 5 MG: 5 INJECTION, SOLUTION INTRAMUSCULAR at 16:29

## 2022-03-24 NOTE — BH NOTES
TRANSFER - IN REPORT:    Verbal report received from [facility] (name) on Merline Beecham  being received from ED (unit) for routine progression of care      Report consisted of patients Situation, Background, Assessment and   Recommendations(SBAR). Information from the following report(s) ED Summary was reviewed with the receiving nurse. Opportunity for questions and clarification was provided. Assessment completed upon patients arrival to unit and care assumed.

## 2022-03-24 NOTE — BSMART NOTE
Patient has been accepted by Grace Zaldivar NP to 732-1. Bed 2 to be blocked due to MRSA history.     Ladonna Souza LPC [facility]

## 2022-03-24 NOTE — BH NOTES
Admission Note        Admitting Diagnosis:         Admitting Status: Voluntary        Patient Received From: [facility] ED        Patient Condition Upon Arrival: Tearful, agitated, verbally aggressive        BAL & UDS: +amphetamines        Reason For Admission: pt reports auditory hallucinations that his family is being tortured, increasing over last 2 weeks     Skin assessment completed by: Trudy Meza RN and Randa Lo score: 23    Patient belongings secured by: Sarah Miranda    Patient consented to safety while on the unit. Patient signed consent forms upon arriving. Denies suicidal and homicidal ideation. Does not appear to be responding to internal stimuli. Patient was agitated and tearful, demanding to leave as soon as he stepped onto the unit. Security was called for backup for support. Patient agreed to take PRN haldol, ativan IM for symptoms and benedryl for prevention of acute dystonic reaction. Called Tamar Wahl to have pt assessed for AMA. Was informed to have patient assessed for TDO. PT gave minimal answers during admission process and was uncooperative. Outcome of TDO is pending. Will continue to monitor with Q15 minute checks.

## 2022-03-24 NOTE — BSMART NOTE
Comprehensive Assessment Form Part 1      Section I - Disposition    Axis I - Unspecified psychosis vs substance-induced psychosis   Methamphetamine Use Disorder, moderate  Axis II - Deferred  Axis III -   Past Medical History:   Diagnosis Date    Crack cocaine use     Pneumomediastinum ([address]          The Medical Doctor to Psychiatrist conference was not completed. The Medical Doctor is in agreement with Psychiatrist disposition because of (reason) patient is seeking admission. The plan is present for admission once medically cleared. The on-call Psychiatrist consulted was Dr. Nitin Ocasio. The admitting Psychiatrist will be Dr. Nitin Ocasio. The admitting Diagnosis is Unspecified psychosis vs substance induced psychosis. The Payor source is [address] [place] [address]. This writer reviewed the [address] in nursing flowsheet and the risk level assigned is not assigned a risk. Based on this assessment, the risk of suicide is low to moderate risk and the plan is admission once medically cleared. Section II - Integrated Summary  Summary:  Patient is a 31yo male who presents to the with his father due to suicidal ideation and hallucinations. Per physician note, \"He presents accompanied by his father and a crisis counselor with complaints of auditory hallucinations and suicidal ideations. According to his father and the patient, the symptoms have been increasing over the past few weeks. His dad states that the voices are telling him horrible things such as his family is being tortured and murdered. He was seen by a psychiatrist this morning and referred to the hospital for admission. He has been eating and drinking well. His symptoms are moderate without relieving factors. \"    Patient seen face to face. He reports that he is here to get help.  He shared that he has been experiencing auditory hallucinations for 10 days and that he is hearing the voices of his family and friends. He reports that voices are screaming and he reports they are being tortured and killed and this is greatly distressing him. He reports not knowing at times if the voices he is hearing are real and he is having to research to determine the validity of them. He reports calling family and friends to check on them. He reports that 10 days ago he was on his property walking and saw something that he shouldn't have. He reports being paranoid since then. He reports meeting with a person that lives on the property and this did not go well and has increased his paranoia. He reports that he is very overwhelmed and is looking for help. He reports the voices are so vivid and real sounding that he has been having thoughts about killing himself to end the torture. He reports that he prefers to sleep because the voices stop when he is sleeping. He does admit to using meth for over a year with last use on Saturday. He reports no other substance use at this time. He was smoking 1 pack per day cigarettes but is down to 3 cigarettes due to fear of leaving the apartment. Patient denies homicidal ideation. He reports only wanting to hurt the people who are hurting his family and he reports that he verifies they are not being hurt so he does not currently want to hurt anyone. Father was present for the assessment with patient's consent. Father reports trying to help the patient. He reports struggling to get to [address] [place] [address] but took the patient to see the crisis clinician for an assessment a few days ago. Over the weekend the patient was at [facility] but reportedly was turned away due to no beds being available and he started a Crisis stabilization program where he was staying in a hotel. He cancelled this service and started services with [facility] with [facility]. He is currently staying in a different hotel provided for by his parents. Father has been present and monitoring patient.  He reports patient was not sleeping for a while but since moving to this new hotel on Monday evening, he has slept 12 or more hours. Father reports concern for patient and was present for the virtual medication management appointment with [facility] today. It was recommended that the patient come to the hospital for admission due to increased symptoms and suicidal ideation. The patienthas demonstrated mental capacity to provide informed consent. The information is given by the patient and parent. The Chief Complaint is suicidal ideation and auditory hallucinations. The Precipitant Factors are possible trauma and chronic substance use. Previous Hospitalizations: no  The patient has not previously been in restraints. Current Psychiatrist and/or  is Eliana [facility] at [facility]. Lethality Assessment:    The potential for suicide noted by the following: active psychosis, ideation and current substance abuse . The potential for homicide is not noted. The patient has not been a perpetrator of sexual or physical abuse. There are not pending charges. The patient is felt to be at risk for self harm or harm to others. The attending nurse was advised to remove potentially harmful or dangerous items from the patient's room , to remove patient clothing and place it out of immediate access to the patient and that security has not been notified. Section III - Psychosocial  The patient's overall mood and attitude is calm and cooperative but paranoid. Feelings of helplessness and hopelessness are observed by verbal report due to hallucinations. Generalized anxiety is observed by verbal report due to hallucinations. Panic is observed by verbal report due to hallucinations. Phobias are not observed. Obsessive compulsive tendencies are not observed. Section IV - Mental Status Exam  The patient's appearance shows no evidence of impairment.   The patient's behavior shows poor eye contact and is restless. The patient is oriented to time, place, person and situation. The patient's speech shows no evidence of impairment. The patient's mood is depressed, is anxious, is withdrawn, is irritable and displays anhedonia. The range of affect is constricted. The patient's thought content demonstrates paranoia. The thought process shows no evidence of impairment. The patient's perception demonstrated changes in the following:  auditory  hallucinations. The patient's memory shows no evidence of impairment. The patient's appetite shows no evidence of impairment. The patient's sleep has evidence of hypersomnia. The patient shows little insight. The patient's judgement shows no evidence of impairment. Section V - Substance Abuse  The patient is using substances. The patient is using tobacco by inhalation for greater than 10 years with last use on today, alcohol for unknown with last use on 2 weeks ago, cocaine for unkown with last use on 3 months ago and other substances by inhalation for 1-5 years with last use on Saturday. The patient has experienced the following withdrawal symptoms: cravings and sleep disturbance. Section VI - Living Arrangements  The patient is single. The patient lives alone. The patient has no children. The patient does plan to return home upon discharge. The patient does not have legal issues pending. The patient's source of income comes from family. Taoist and cultural practices have not been voiced at this time. The patient's greatest support comes from mother and father and this person will be involved with the treatment. The patient has not been in an event described as horrible or outside the realm of ordinary life experience either currently or in the past.  The patient has not been a victim of sexual/physical abuse.     Section VII - Other Areas of Clinical Concern  The highest grade achieved is 12th with the overall quality of school experience being described as not assessed. The patient is currently employed and speaks [place] as a primary language. The patient has no communication impairments affecting communication. The patient's preference for learning can be described as: can read and write adequately.   The patient's hearing is normal.  The patient's vision is normal.      Ana Jameson LPC [facility]

## 2022-03-24 NOTE — ED PROVIDER NOTES
History of cocaine use. He presents accompanied by his father and a crisis counselor with complaints of auditory hallucinations and suicidal ideations. According to his father and the patient, the symptoms have been increasing over the past few weeks. His dad states that the voices are telling him horrible things such as his family is being tortured and murdered. He was seen by a psychiatrist this morning and referred to the hospital for admission. He has been eating and drinking well. His symptoms are moderate without relieving factors. Past Medical History:   Diagnosis Date    Crack cocaine use     Pneumomediastinum ([address]        No past surgical history on file. No family history on file. Social History     Socioeconomic History    Marital status: SINGLE     Spouse name: Not on file    Number of children: Not on file    Years of education: Not on file    Highest education level: Not on file   Occupational History    Not on file   Tobacco Use    Smoking status: Current Every Day Smoker    Smokeless tobacco: Never Used   Substance and Sexual Activity    Alcohol use: Yes    Drug use: Yes     Types: Cocaine    Sexual activity: Not on file   Other Topics Concern    Not on file   Social History Narrative    Not on file     Social Determinants of Health     Financial Resource Strain:     Difficulty of Paying Living Expenses: Not on file   Food Insecurity:     Worried About Running Out of Food in the Last Year: Not on file    Travis of Food in the Last Year: Not on file   Transportation Needs:     Lack of Transportation (Medical): Not on file    Lack of Transportation (Non-Medical):  Not on file   Physical Activity:     Days of Exercise per Week: Not on file    Minutes of Exercise per Session: Not on file   Stress:     Feeling of Stress : Not on file   Social Connections:     Frequency of Communication with Friends and Family: Not on file    Frequency of Social Gatherings with Friends and Family: Not on file    Attends Church Services: Not on file    Active Member of Clubs or Organizations: Not on file    Attends Club or Organization Meetings: Not on file    Marital Status: Not on file   Intimate Partner Violence:     Fear of Current or Ex-Partner: Not on file    Emotionally Abused: Not on file    Physically Abused: Not on file    Sexually Abused: Not on file   Housing Stability:     Unable to Pay for Housing in the Last Year: Not on file    Number of [place] in the Last Year: Not on file    Unstable Housing in the Last Year: Not on file         ALLERGIES: Patient has no known allergies. Review of Systems   All other systems reviewed and are negative. Vitals:    03/24/22 1111   BP: 136/86   Pulse: 72   Resp: 18   Temp: 98.4 °F (36.9 °C)   SpO2: 98%            Physical Exam  Vitals and nursing note reviewed. Constitutional:       Appearance: He is well-developed. HENT:      Head: Normocephalic and atraumatic. Eyes:      Conjunctiva/sclera: Conjunctivae normal.   Neck:      Trachea: No tracheal deviation. Cardiovascular:      Rate and Rhythm: Normal rate and regular rhythm. Heart sounds: Normal heart sounds. No murmur heard. No friction rub. No gallop. Pulmonary:      Effort: Pulmonary effort is normal.      Breath sounds: Normal breath sounds. Abdominal:      Palpations: Abdomen is soft. Tenderness: There is no abdominal tenderness. Musculoskeletal:         General: No deformity. Cervical back: Neck supple. Skin:     General: Skin is warm and dry. Neurological:      Mental Status: He is alert. Comments: oriented          MDM       Procedures    Assessment/plan: Suicidal ideations -admit to psych.   Amanda Concepcion MD  3:36 PM

## 2022-03-24 NOTE — ED TRIAGE NOTES
Pt arrives for months of suicidal thoughts with auditory hallucinations, no plan. Homicidal thoughts towards family members, has no access to weapons. Arrives from psych program on day 3 where he continued to express these thoughts and was referred to ed for evaluation and placement. Denies taking any psych meds currently.

## 2022-03-24 NOTE — ED NOTES
Report called to Trudy Meza [facility], on 7W. All questions answered.

## 2022-03-25 LAB — TSH SERPL DL<=0.05 MIU/L-ACNC: 1.67 UIU/ML (ref 0.36–3.74)

## 2022-03-25 PROCEDURE — 65220000003 HC RM SEMIPRIVATE PSYCH

## 2022-03-25 PROCEDURE — 74011250637 HC RX REV CODE- 250/637: Performed by: PSYCHIATRY & NEUROLOGY

## 2022-03-25 PROCEDURE — 74011250637 HC RX REV CODE- 250/637: Performed by: NURSE PRACTITIONER

## 2022-03-25 RX ORDER — DM/P-EPHED/ACETAMINOPH/DOXYLAM 30-7.5/3
2 LIQUID (ML) ORAL
Status: DISCONTINUED | OUTPATIENT
Start: 2022-03-25 | End: 2022-03-30 | Stop reason: HOSPADM

## 2022-03-25 RX ORDER — RISPERIDONE 1 MG/1
1 TABLET, FILM COATED ORAL 2 TIMES DAILY
Status: DISCONTINUED | OUTPATIENT
Start: 2022-03-25 | End: 2022-03-30 | Stop reason: HOSPADM

## 2022-03-25 RX ORDER — IBUPROFEN 200 MG
1 TABLET ORAL DAILY
Status: DISCONTINUED | OUTPATIENT
Start: 2022-03-26 | End: 2022-03-30 | Stop reason: HOSPADM

## 2022-03-25 RX ORDER — TRAZODONE HYDROCHLORIDE 100 MG/1
100 TABLET ORAL
Status: DISCONTINUED | OUTPATIENT
Start: 2022-03-25 | End: 2022-03-30 | Stop reason: HOSPADM

## 2022-03-25 RX ADMIN — TRAZODONE HYDROCHLORIDE 100 MG: 100 TABLET ORAL at 20:59

## 2022-03-25 RX ADMIN — RISPERIDONE 1 MG: 1 TABLET, FILM COATED ORAL at 17:12

## 2022-03-25 RX ADMIN — NICOTINE POLACRILEX 2 MG: 2 LOZENGE ORAL at 20:48

## 2022-03-25 RX ADMIN — OLANZAPINE 5 MG: 5 TABLET, FILM COATED ORAL at 20:59

## 2022-03-25 RX ADMIN — HYDROXYZINE HYDROCHLORIDE 50 MG: 50 TABLET, FILM COATED ORAL at 21:00

## 2022-03-25 NOTE — PROGRESS NOTES
Problem: Tobacco Use  Goal: *Tobacco use abstinence  Outcome: Resolved/Met  Goal: *Knowledge of tobacco use cessation methods  Outcome: Resolved/Met     Problem: Altered Thought Process (Adult/Pediatric)  Goal: *STG: Decreased hallucinations  Outcome: Not Progressing Towards Goal     Problem: Altered Thought Process (Adult/Pediatric)  Goal: *STG: Seeks staff when feelings of anxiety and fear arise  Outcome: Progressing Towards Goal

## 2022-03-25 NOTE — BH NOTES
PSYCHOSOCIAL ASSESSMENT  :Patient identifying info:   Nalini Layton is a 27 y.o., male admitted 3/24/2022 11:15 AM     Presenting problem and precipitating factors: Pt present at the ED with SI and AVH. Pt was accompanied by his father who stated that symptoms have been increasing over the last two weeks. Pt saw a psychiatrist this morning who referred him to tx. Pt has hx of methamphetamine use. Mental status assessment: Pt is alert and oriented x4. Pts speech is slow and eye contact is avoidant. Pt appears agitated. Strengths/Recreation/Coping Skills:     Collateral information: Romeo Crystal. [phone]    Current psychiatric /substance abuse providers and contact info: not reported     Previous psychiatric/substance abuse providers and response to treatment: unknown    Family history of mental illness or substance abuse: not reported     Substance abuse history:  Methamphetamine   Social History     Tobacco Use    Smoking status: Current Every Day Smoker    Smokeless tobacco: Never Used   Substance Use Topics    Alcohol use: Yes       History of biomedical complications associated with substance abuse: unknown    Patient's current acceptance of treatment or motivation for change: TDO    Family constellation: single, no children    Is significant other involved? no    Describe support system: Mom and Dad    Describe living arrangements and home environment: currently lives with parents. GUARDIAN/POA: no    Guardian Name:     Guardian Contact:     Health issues:   Hospital Problems  Date Reviewed: 5/7/2018          Codes Class Noted POA    [facility] ICD-10-CM: F29  ICD-9-CM: 298.9  3/24/2022 Unknown              Trauma history: not reported     Legal issues: no    History of  service: no    Financial status: unknown    Protestant/cultural factors: not reported     Education/work history: employed with dad sometimes.     Have you been licensed as a health care professional (current or ): no    Leisure and recreation preferences: not reported     Describe coping skills:flori Fernandes  3/25/2022

## 2022-03-25 NOTE — PROGRESS NOTES
Patient received resting quietly in bed. NAD. Patient has been isolative to room. Patient has signed voluntary commitment form. Meal and medication compliant. Remains safe on unit. Will continue to monitor with q15min rounds and provide support. Problem: Risk for Spread of Infection  Goal: Prevent transmission of infectious organism to others  Description: Prevent the transmission of infectious organisms to other patients, staff members, and visitors. Outcome: Progressing Towards Goal     Problem: Falls - Risk of  Goal: *Absence of Falls  Description: Document Josh Brush Fall Risk and appropriate interventions in the flowsheet.   Outcome: Progressing Towards Goal  Note: Fall Risk Interventions:            Medication Interventions: Teach patient to arise slowly                   Problem: Altered Thought Process (Adult/Pediatric)  Goal: *STG: Remains safe in hospital  Outcome: Progressing Towards Goal  Goal: *STG: Seeks staff when feelings of anxiety and fear arise  Outcome: Progressing Towards Goal No

## 2022-03-25 NOTE — PROGRESS NOTES
Problem: Falls - Risk of  Goal: *Absence of Falls  Description: Document Mena Diaz Fall Risk and appropriate interventions in the flowsheet. Outcome: Progressing Towards Goal  Note: Fall Risk Interventions:        Medication Interventions: Teach patient to arise slowly      Patient received resting quietly in bed. No signs of distress. Even and unlabored breathing. Staff will continue to monitor safety q15 and provide support.

## 2022-03-25 NOTE — INTERDISCIPLINARY ROUNDS
Behavioral Health Interdisciplinary Rounds     Patient Name: Heidy Roldan  Age: 27 y.o. Room/Bed:  732/  Primary Diagnosis: <principal problem not specified>   Admission Status: TDO     Readmission within 30 days: no  Power of  in place: no  Patient requires a blocked bed: yes          Reason for blocked bed: Contact - MRSA    VTE Prophylaxis: No    Mobility needs/Fall risk: no  Flu Vaccine : no   Nutritional Plan: no  Consults:          Labs/Testing due today?: no    Sleep hours: 8+       Participation in Care/Groups:  New admit  Medication Compliant?: new admit  PRNS (last 24 hours): Antipsychotic (IM)    Restraints (last 24 hours):  no     CIWA (range last 24 hours):     COWS (range last 24 hours):      Alcohol screening (AUDIT) completed -         If applicable, date SBIRT discussed in treatment team AND documented:   AUDIT Screen Score:        Document Brief Intervention (corresponds directly with the 5 A's, Ask, Advise, Assess, Assist, and Arrange): At- Risk Patients (Score 7-15 for women; 8-15 for men)  Discuss concern patient is drinking at unhealthy levels known to increase risk of alcohol-related health problems. Is Patient ready to commit to change? If No:   Encourage reflection   Discuss short term and long term health risks of consuming alcohol   Barriers to change   Reaffirm willingness to help / Educational materials provided  If Yes:   Set goal  [employer] provided    Harmful use or Dependence (Score 16 or greater)   Discuss short term and long term health risks of consuming alcohol   Recommendations   Negotiate drinking goal   Recommend addiction specialist/center   Arrange follow-up appointments.     Tobacco - patient is a smoker: Have You Used Tobacco in the Past 30 Days: Yes  Illegal Drugs use: Have You Used Any Illegal Substances Over the Past 12 Months: Yes    24 hour chart check complete: yes ____________________________________________________________________________________________________________    Patient goal(s) for today: Communicate needs to staff  Treatment team focus/goals: Assess pt, medication management,. Discharge planning. Progress note : Pt was agitated. Pt is med compliant. Received B51 last night. Pt has hx of meth use. Pts medication was adjusted. Pt reports increased AH.      LOS:  1  Expected LOS: TBD    Financial concerns/prescription coverage:    Family contact:  Joe Mueller  [phone]    Family requesting physician contact today:    Discharge plan: TBD  Access to weapons : no      Outpatient provider(s):  Patient's preferred phone number for follow up call :  Patient's preferred e-mail address :  Participating treatment team members: ABILIO Golden, Traci Clifford., RN, Dr. Willi Sarmiento

## 2022-03-25 NOTE — H&P
[address] [facility] HISTORY AND PHYSICAL    Name:  Lilian Seo  MR#:  [id number]  :  1995  ACCOUNT #:  [de-identified]  ADMIT DATE:  2022    INITIAL PSYCHIATRIC INTERVIEW    CHIEF COMPLAINT:  Dacia Bonilla tricked me in to coming here. \"    HISTORY OF PRESENT ILLNESS:  The patient is a 68-year-old male who is currently admitted to the [facility] Unit at [facility] and was involuntarily committed to the hospital today. He was brought in on a TDO with a report from the family of him having been increasingly agitated and having bizarre behavior. He reports that for the past 3 days, he has been having thoughts of suicide and over the last 10 days, he has been hearing voices, which can get loud and quite intrusive at times. He had reported that when he hears the voices, he wants to end his life because they are so loud and insistent. He has been using methamphetamine for the past year and estimates that he was using about 25 dollars worth of methamphetamine at least 4 or 5 times a week. His last use was yesterday. He admits that the use of meth has been most likely the cause of his hallucinations and understands the relationship with this. States that he has not been going to work for the last 10 days, has been sleeping poorly and has had little appetite. He was mildly agitated during the interview, repeatedly saying under his breath that he had been tricked in to coming here and that he wanted to leave. However, he was redirectable and cooperative with most of the interview. States that he feels paranoid and thinks that there are people out to get him. PAST MEDICAL HISTORY:  Reviewed as per the history and physical exam.      Past Medical History:   Diagnosis Date    Crack cocaine use     Pneumomediastinum ([address]      Prior to Admission medications    Medication Sig Start Date End Date Taking?  Authorizing Provider   mupirocin (BACTROBAN) 2 % ointment Apply  to affected area daily. Patient not taking: Reported on 3/26/2022 3/9/22   PREMA Gomez   famotidine (PEPCID) 20 mg tablet Take 1 Tab by mouth two (2) times a day. Patient not taking: Reported on 3/26/2022 6/6/18   Tc Burleson MD     Vitals:    03/27/22 1259 03/27/22 1650 03/27/22 2023 03/28/22 0825   BP: 135/79 130/65 105/63 111/75   Pulse: 77 81 (!) 54 68   Resp: 16 16 16 16   Temp: 98 °F (36.7 °C) 97.8 °F (36.6 °C) 98 °F (36.7 °C) 97.8 °F (36.6 °C)   SpO2:  97% 98% 98%   Weight:       Height:         Lab Results   Component Value Date/Time    WBC 8.0 03/24/2022 11:49 AM    HGB 14.5 03/24/2022 11:49 AM    HCT 43.6 03/24/2022 11:49 AM    PLATELET 359 68/96/3294 11:49 AM    MCV 89.7 03/24/2022 11:49 AM     Lab Results   Component Value Date/Time    Sodium 136 03/24/2022 11:49 AM    Potassium 3.9 03/24/2022 11:49 AM    Chloride 104 03/24/2022 11:49 AM    CO2 28 03/24/2022 11:49 AM    Anion gap 4 (L) 03/24/2022 11:49 AM    Glucose 108 (H) 03/24/2022 11:49 AM    BUN 15 03/24/2022 11:49 AM    Creatinine 0.87 03/24/2022 11:49 AM    BUN/Creatinine ratio 17 03/24/2022 11:49 AM    GFR est AA >60 03/24/2022 11:49 AM    GFR est non-AA >60 03/24/2022 11:49 AM    Calcium 9.0 03/24/2022 11:49 AM    Bilirubin, total 0.3 03/24/2022 11:49 AM    Alk. phosphatase 69 03/24/2022 11:49 AM    Protein, total 7.2 03/24/2022 11:49 AM    Albumin 3.6 03/24/2022 11:49 AM    Globulin 3.6 03/24/2022 11:49 AM    A-G Ratio 1.0 (L) 03/24/2022 11:49 AM    ALT (SGPT) 18 03/24/2022 11:49 AM    AST (SGOT) 12 (L) 03/24/2022 11:49 AM     No results found for: VALF2, VALAC, VALP, VALPR, DS6, CRBAM, CRBAMP, CARB2, XCRBAM  No results found for: LITHM  RADIOLOGY REPORTS:(reviewed/updated 3/28/2022)  No results found. PAST PSYCHIATRIC HISTORY:  The patient denies any prior history of inpatient hospitalizations or suicide attempts. Denies any prior history of similar symptoms as today.   States that he started using methamphetamine about a year ago, but has experimented with other drugs in the past.    PSYCHOSOCIAL HISTORY:  Currently, the patient lives in [place] [facility] with both of his parents. 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 He has never been , does not have any children, and is not in a relationship at the present time. Denies any significant legal stressors at the present time. MENTAL STATUS EXAM:  The patient is a young  male who is dressed in hospital apparel. He is poorly groomed and has not shaved or combed his hair in many days. He makes limited eye contact and was mildly agitated during the interview. He kept muttering under his breath. His speech is spontaneous and coherent, although decreased in output. Mood is reported as being upset and his affect is blunted. Auditory hallucinations are present as described above. Paranoid delusions of persecution and reference are also present. His thought process is mildly disorganized. Cognitively, he is awake and alert, oriented to time, place and person. Intelligence is average. Memory is intact and fund of knowledge is adequate. Insight is poor and judgment is poor. ASSESSMENT AND PLAN/DIAGNOSIS:  Unspecified psychosis, rule out substance-induced psychosis, methamphetamine use disorder, severe. At the present time, we will continue his inpatient stay. He will be provided with support and attend groups. Psychotropic medications will be adjusted as needed. His strengths include stable housing and support from his family.       MD YVROSE [id number]  D:  03/25/2022 14:07  T:  03/25/2022 15:38  JOB #:  [id number]

## 2022-03-26 PROCEDURE — 65220000003 HC RM SEMIPRIVATE PSYCH

## 2022-03-26 PROCEDURE — 74011250636 HC RX REV CODE- 250/636: Performed by: NURSE PRACTITIONER

## 2022-03-26 PROCEDURE — 74011250637 HC RX REV CODE- 250/637: Performed by: NURSE PRACTITIONER

## 2022-03-26 PROCEDURE — 74011250637 HC RX REV CODE- 250/637: Performed by: PSYCHIATRY & NEUROLOGY

## 2022-03-26 RX ORDER — BUPRENORPHINE HYDROCHLORIDE AND NALOXONE HYDROCHLORIDE DIHYDRATE 8; 2 MG/1; MG/1
1 TABLET SUBLINGUAL 2 TIMES DAILY
Status: DISCONTINUED | OUTPATIENT
Start: 2022-03-26 | End: 2022-03-28

## 2022-03-26 RX ORDER — ONDANSETRON 4 MG/1
4 TABLET, ORALLY DISINTEGRATING ORAL
Status: COMPLETED | OUTPATIENT
Start: 2022-03-26 | End: 2022-03-26

## 2022-03-26 RX ORDER — SCOLOPAMINE TRANSDERMAL SYSTEM 1 MG/1
1 PATCH, EXTENDED RELEASE TRANSDERMAL
Status: DISCONTINUED | OUTPATIENT
Start: 2022-03-26 | End: 2022-03-30 | Stop reason: HOSPADM

## 2022-03-26 RX ADMIN — RISPERIDONE 1 MG: 1 TABLET, FILM COATED ORAL at 17:26

## 2022-03-26 RX ADMIN — ONDANSETRON 4 MG: 4 TABLET, ORALLY DISINTEGRATING ORAL at 11:06

## 2022-03-26 RX ADMIN — TRAZODONE HYDROCHLORIDE 100 MG: 100 TABLET ORAL at 21:00

## 2022-03-26 RX ADMIN — RISPERIDONE 1 MG: 1 TABLET, FILM COATED ORAL at 10:04

## 2022-03-26 RX ADMIN — HYDROXYZINE HYDROCHLORIDE 50 MG: 50 TABLET, FILM COATED ORAL at 17:27

## 2022-03-26 RX ADMIN — BUPRENORPHINE HYDROCHLORIDE AND NALOXONE HYDROCHLORIDE DIHYDRATE 1 TABLET: 8; 2 TABLET SUBLINGUAL at 13:17

## 2022-03-26 NOTE — BH NOTES
PRN Medication Documentation    Specific patient behavior that led to need for PRN medication: anxiety  Staff interventions attempted prior to PRN being given: coping skills  PRN medication given:atarax  Patient response/effectiveness of PRN medication: tl aware Completed Therapy?: No

## 2022-03-26 NOTE — INTERDISCIPLINARY ROUNDS
Behavioral Health Interdisciplinary Rounds     Patient Name: Jerrald Cabot  Age: 27 y.o. Room/Bed:  732/  Primary Diagnosis: <principal problem not specified>   Admission Status: Voluntary Commitment     Readmission within 30 days: no  Power of  in place: no  Patient requires a blocked bed: yes          Reason for blocked bed: MRSA    VTE Prophylaxis: No    Mobility needs/Fall risk: no  Flu Vaccine : no   Nutritional Plan: no  Consults:          Labs/Testing due today?: no    Sleep hours:  7      Participation in Care/Groups:   Medication Compliant?: Yes  PRNS (last 24 hours): Antipsychotic (PO) and Antianxiety    Restraints (last 24 hours):  no     CIWA (range last 24 hours):     COWS (range last 24 hours):      Alcohol screening (AUDIT) completed -         If applicable, date SBIRT discussed in treatment team AND documented:   AUDIT Screen Score:        Document Brief Intervention (corresponds directly with the 5 A's, Ask, Advise, Assess, Assist, and Arrange): At- Risk Patients (Score 7-15 for women; 8-15 for men)  Discuss concern patient is drinking at unhealthy levels known to increase risk of alcohol-related health problems. Is Patient ready to commit to change? If No:   Encourage reflection   Discuss short term and long term health risks of consuming alcohol   Barriers to change   Reaffirm willingness to help / Educational materials provided  If Yes:   Set goal  [employer] provided    Harmful use or Dependence (Score 16 or greater)   Discuss short term and long term health risks of consuming alcohol   Recommendations   Negotiate drinking goal   Recommend addiction specialist/center   Arrange follow-up appointments.     Tobacco - patient is a smoker: Have You Used Tobacco in the Past 30 Days: Yes  Illegal Drugs use: Have You Used Any Illegal Substances Over the Past 12 Months: Yes    24 hour chart check complete: yes ____________________________________________________________________________________________________________    Patient goal(s) for today:   Treatment team focus/goals:   Progress note     LOS:  2  Expected LOS:     Financial concerns/prescription coverage:    Family contact:       Family requesting physician contact today:    Discharge plan:   Access to weapons :        Outpatient provider(s):   Patient's preferred phone number for follow up call :   Patient's preferred e-mail address :  Participating treatment team members: Amanda Crooks, * (assigned SW),

## 2022-03-26 NOTE — BH NOTES
0915: Patient has signed COREEN for Father, Joe Dietz, reached out via telephone regarding where patient gets Suboxone filled, unable to find name in chart of who prescribes prescription. Dad gave information of:   [address] [phone]  Called and should be faxing COREEN form from treatment center to have patient sign to release information regarding prescription. 1115: Patient refused to meet with treatment team, however rounded on patient after seeing others. Patient had complaints of nausea; one time dose of Zofran prescribed and administered for patient. Patient previously irritable about receiving Suboxone; was able to verify medication and patient will receive. Mood and affect; dull, flat, isolative, and irritable. Denies SI/HI. Denies AH/VH. Medication and meal complaint. Will continue to monitor q15 minutes for safety checks.          Blocked Bed Documentation:    Room number: 597  Type: Contact Precautions  Rationale: MRSA  Anticipated duration: Hospital Duration   Additional comments: History of MRSA

## 2022-03-26 NOTE — PROGRESS NOTES
Problem: Altered Thought Process (Adult/Pediatric)  Goal: *STG: Participates in treatment plan  Outcome: Progressing Towards Goal     Problem: Altered Thought Process (Adult/Pediatric)  Goal: *STG: Seeks staff when feelings of anxiety and fear arise  3/26/2022 1452 by Davis Narayanan  Outcome: Progressing Towards Goal     Problem: Altered Thought Process (Adult/Pediatric)  Goal: *STG: Complies with medication therapy  Outcome: Progressing Towards Goal     Problem: Altered Thought Process (Adult/Pediatric)  Goal: Interventions  3/26/2022 1452 by Davis Narayanan  Outcome: Progressing Towards Goal  Note: Pt is resting in bed. Medication compliant. Irritable and labile. 1550- pt appears to be asleep in bed. Respirations are regular and unlabored. NAD observed. No evidences of emesis during the evening.

## 2022-03-26 NOTE — PROGRESS NOTES
Problem: Falls - Risk of  Goal: *Absence of Falls  Description: Document Kiki Edward Fall Risk and appropriate interventions in the flowsheet. Outcome: Progressing Towards Goal  Note: Fall Risk Interventions:            Medication Interventions: Teach patient to arise slowly    Resting in bed with eyes closed, no complaints, no distress noted. Safety measures in place, will continue to monitor.

## 2022-03-26 NOTE — BH NOTES
Psychiatric Progress Note    Patient: Jose De La Torre MRN: [id number]  SSN: [id number]    YOB: 1995  Age: 27 y.o. Sex: male      Admit Date: 3/24/2022    LOS: 2 days     Subjective:     Jose De La Torre was irritable and withdrawn during assessment. He complained of nausea. He denies SI/plan/intent and HI/plan/intent. Denies AVH. He is compliant with medication and feels it is helpful. Objective:     Vitals:    03/25/22 1408 03/25/22 2056 03/26/22 0937 03/26/22 1004   BP: (!) 136/54   117/76   Pulse: 66 78  76   Resp: 14   16   Temp: 98.3 °F (36.8 °C)   98.2 °F (36.8 °C)   SpO2: 97%   97%   Weight:   75.3 kg (166 lb)    Height:   6' (1.829 m)       Mental Status Exam:   General appearance: moderately groomed, psychomotor activity is WNL  Eye contact: poor  Speech: Spontaneous and coherent  Affect : guarded  Mood: \"OK\"  Thought Process: Logical, goal directed  Perception: +AH   Thought Content: Denies SI/plan/intent, denies HI/plan/intent  Insight: poor  Judgment: poor  Cognition: Intact grossly.     MEDICATIONS:  Current Facility-Administered Medications   Medication Dose Route Frequency    buprenorphine-naloxone (SUBOXONE) 8-2mg SL tablet  1 Tablet SubLINGual BID    risperiDONE (RisperDAL) tablet 1 mg  1 mg Oral BID    traZODone (DESYREL) tablet 100 mg  100 mg Oral QHS    nicotine (NICODERM CQ) 21 mg/24 hr patch 1 Patch  1 Patch TransDERmal DAILY    nicotine buccal (POLACRILEX) lozenge 2 mg  2 mg Oral Q2H PRN    OLANZapine (ZyPREXA) tablet 5 mg  5 mg Oral Q6H PRN    haloperidol lactate (HALDOL) injection 5 mg  5 mg IntraMUSCular Q6H PRN    benztropine (COGENTIN) tablet 1 mg  1 mg Oral BID PRN    diphenhydrAMINE (BENADRYL) injection 50 mg  50 mg IntraMUSCular BID PRN    hydrOXYzine HCL (ATARAX) tablet 50 mg  50 mg Oral TID PRN    LORazepam (ATIVAN) injection 1 mg  1 mg IntraMUSCular Q4H PRN    acetaminophen (TYLENOL) tablet 650 mg  650 mg Oral Q4H PRN    magnesium hydroxide (MILK OF MAGNESIA) 400 mg/5 mL oral suspension 30 mL  30 mL Oral DAILY PRN    ibuprofen (MOTRIN) tablet 400 mg  400 mg Oral Q8H PRN        Assessment:   Unspecified psychosis, rule out substance-induced psychosis, methamphetamine use disorder, severe.     Plan:     Continue current care  Disposition planning with social work    Signed By: Greg Staley NP     March 26, 2022

## 2022-03-27 PROCEDURE — 65220000003 HC RM SEMIPRIVATE PSYCH

## 2022-03-27 PROCEDURE — 74011250637 HC RX REV CODE- 250/637: Performed by: NURSE PRACTITIONER

## 2022-03-27 PROCEDURE — 74011250637 HC RX REV CODE- 250/637: Performed by: PSYCHIATRY & NEUROLOGY

## 2022-03-27 PROCEDURE — 74011250636 HC RX REV CODE- 250/636: Performed by: NURSE PRACTITIONER

## 2022-03-27 RX ADMIN — TRAZODONE HYDROCHLORIDE 100 MG: 100 TABLET ORAL at 20:52

## 2022-03-27 RX ADMIN — BUPRENORPHINE HYDROCHLORIDE AND NALOXONE HYDROCHLORIDE DIHYDRATE 1 TABLET: 8; 2 TABLET SUBLINGUAL at 10:02

## 2022-03-27 RX ADMIN — RISPERIDONE 1 MG: 1 TABLET, FILM COATED ORAL at 16:38

## 2022-03-27 RX ADMIN — HYDROXYZINE HYDROCHLORIDE 50 MG: 50 TABLET, FILM COATED ORAL at 17:00

## 2022-03-27 RX ADMIN — RISPERIDONE 1 MG: 1 TABLET, FILM COATED ORAL at 10:02

## 2022-03-27 NOTE — BH NOTES
PRN Medication Documentation    Specific patient behavior that led to need for PRN medication: anxiety  Staff interventions attempted prior to PRN being given:coping skills PRN medication given: atarax  Patient response/effectiveness of PRN medication: tl aware

## 2022-03-27 NOTE — PROGRESS NOTES
Problem: Falls - Risk of  Goal: *Absence of Falls  Description: Document Belinda Don Fall Risk and appropriate interventions in the flowsheet. Outcome: Progressing Towards Goal  Note: Fall Risk Interventions:            Medication Interventions: Teach patient to arise slowly       Problem: Altered Thought Process (Adult/Pediatric)  Goal: *STG: Complies with medication therapy  Outcome: Progressing Towards Goal     Problem: Altered Thought Process (Adult/Pediatric)  Goal: *STG: Participates in treatment plan  Outcome: Not Progressing Towards Goal  Goal: *STG: Seeks staff when feelings of anxiety and fear arise  Outcome: Not Progressing Towards Goal    Blocked Bed Documentation:    Room number: 685  Type: Contact Precautions  Rationale: MRSA  Anticipated duration: Hospital Duration  Additional comments: History of MRSA    1215: Patient minimally participatory in treatment team. Mood and affect; calm, cooperative and pleasant. Patient reports feeling better than yesterday and nausea and vomiting has decreased. Patient smiling and able to have a conversation compared to yesterday. Denies SI/HI. Denies AH/VH. Medication and meal compliant. Will continue to monitor q15 minutes for safety checks.

## 2022-03-27 NOTE — BH NOTES
Pt refuses breakfast and is isolative to his room. 1010-pt is alert and oriented in bed. VS stable. Denies n/v. Denies SI, HI, AVH. Pt states he feels better \"I am just weak. I'm not hearing any voices. \"   Pt is medication complaint.

## 2022-03-27 NOTE — BH NOTES
Psychiatric Progress Note    Patient: Emani Mann MRN: [id number]  SSN: [id number]    YOB: 1995  Age: 27 y.o. Sex: male      Admit Date: 3/24/2022    LOS: 3 days     Subjective:     Emani Mann is doing better. He states he is more comfortable. His nausea is improved. He remains withdrawn. He denies SI/plan/intent and HI/plan/intent. Endorses mild AH but it is improving. He is compliant with medication and feels it is helpful. Objective:     Vitals:    03/26/22 1004 03/26/22 1604 03/26/22 1805 03/27/22 0940   BP: 117/76   138/75   Pulse: 76   (!) 56   Resp: 16 12 16 14   Temp: 98.2 °F (36.8 °C)   97.8 °F (36.6 °C)   SpO2: 97%   99%   Weight:       Height:          Mental Status Exam:   General appearance: moderately groomed, psychomotor activity is WNL  Eye contact: poor  Speech: Spontaneous and coherent  Affect : guarded  Mood: \"OK\"  Thought Process: Logical, goal directed  Perception: +AH   Thought Content: Denies SI/plan/intent, denies HI/plan/intent  Insight: poor  Judgment: poor  Cognition: Intact grossly.     MEDICATIONS:  Current Facility-Administered Medications   Medication Dose Route Frequency    buprenorphine-naloxone (SUBOXONE) 8-2mg SL tablet  1 Tablet SubLINGual BID    scopolamine (TRANSDERM-SCOP) 1 mg over 3 days 1 Patch  1 Patch TransDERmal Q72H    risperiDONE (RisperDAL) tablet 1 mg  1 mg Oral BID    traZODone (DESYREL) tablet 100 mg  100 mg Oral QHS    nicotine (NICODERM CQ) 21 mg/24 hr patch 1 Patch  1 Patch TransDERmal DAILY    nicotine buccal (POLACRILEX) lozenge 2 mg  2 mg Oral Q2H PRN    OLANZapine (ZyPREXA) tablet 5 mg  5 mg Oral Q6H PRN    haloperidol lactate (HALDOL) injection 5 mg  5 mg IntraMUSCular Q6H PRN    benztropine (COGENTIN) tablet 1 mg  1 mg Oral BID PRN    diphenhydrAMINE (BENADRYL) injection 50 mg  50 mg IntraMUSCular BID PRN    hydrOXYzine HCL (ATARAX) tablet 50 mg  50 mg Oral TID PRN    LORazepam (ATIVAN) injection 1 mg  1 mg IntraMUSCular Q4H PRN    acetaminophen (TYLENOL) tablet 650 mg  650 mg Oral Q4H PRN    magnesium hydroxide (MILK OF MAGNESIA) 400 mg/5 mL oral suspension 30 mL  30 mL Oral DAILY PRN    ibuprofen (MOTRIN) tablet 400 mg  400 mg Oral Q8H PRN        Assessment:   Unspecified psychosis, rule out substance-induced psychosis, methamphetamine use disorder, severe.     Plan:     Continue current care  Disposition planning with social work    Signed By: Rianna Plaza NP     March 27, 2022

## 2022-03-27 NOTE — PROGRESS NOTES
Problem: Falls - Risk of  Goal: *Absence of Falls  Description: Document Chow Blades Fall Risk and appropriate interventions in the flowsheet. Outcome: Progressing Towards Goal  Note: Fall Risk Interventions:            Medication Interventions: Teach patient to arise slowly    Resting in bed with eyes closed, no complaints, no distress noted. Safety measures in place, will continue to monitor.

## 2022-03-27 NOTE — PROGRESS NOTES
Problem: Altered Thought Process (Adult/Pediatric)  Goal: *STG: Complies with medication therapy  Outcome: Progressing Towards Goal     Problem: Altered Thought Process (Adult/Pediatric)  Goal: *STG: Attends activities and groups  Outcome: Not Progressing Towards Goal     Problem: Altered Thought Process (Adult/Pediatric)  Goal: *STG: Decreased hallucinations  Outcome: Progressing Towards Goal     Problem: Altered Thought Process (Adult/Pediatric)  Goal: Interventions  Outcome: Progressing Towards Goal  Note: Pt remains isolative to his room. Reduced AH. Denies SI and HI. Paranoid delusions not observed. Denies N/V.    pt ate 50% of lunch in his room. Tolerating oral fluids fair. Lightly brighter affect today compared to yesterday. Medication and meal compliant. Pt eats dinner in his room. Tolerates well. Pt smiles when engaged by staff. \"I am feeling better. \"   1700 - pt is using the phone to call dad.

## 2022-03-28 PROCEDURE — 74011250637 HC RX REV CODE- 250/637: Performed by: PSYCHIATRY & NEUROLOGY

## 2022-03-28 PROCEDURE — 74011250636 HC RX REV CODE- 250/636: Performed by: NURSE PRACTITIONER

## 2022-03-28 PROCEDURE — 65220000003 HC RM SEMIPRIVATE PSYCH

## 2022-03-28 RX ORDER — BUPRENORPHINE HYDROCHLORIDE AND NALOXONE HYDROCHLORIDE DIHYDRATE 8; 2 MG/1; MG/1
1 TABLET SUBLINGUAL DAILY
Status: DISCONTINUED | OUTPATIENT
Start: 2022-03-29 | End: 2022-03-30 | Stop reason: HOSPADM

## 2022-03-28 RX ORDER — BUPRENORPHINE AND NALOXONE 8; 2 MG/1; MG/1
1 FILM, SOLUBLE BUCCAL; SUBLINGUAL DAILY
COMMUNITY
End: 2022-03-30

## 2022-03-28 RX ADMIN — BUPRENORPHINE HYDROCHLORIDE AND NALOXONE HYDROCHLORIDE DIHYDRATE 1 TABLET: 8; 2 TABLET SUBLINGUAL at 08:51

## 2022-03-28 RX ADMIN — RISPERIDONE 1 MG: 1 TABLET, FILM COATED ORAL at 08:51

## 2022-03-28 RX ADMIN — TRAZODONE HYDROCHLORIDE 100 MG: 100 TABLET ORAL at 22:25

## 2022-03-28 RX ADMIN — RISPERIDONE 1 MG: 1 TABLET, FILM COATED ORAL at 19:08

## 2022-03-28 NOTE — PROGRESS NOTES
Problem: Altered Thought Process (Adult/Pediatric)  Goal: *STG: Participates in treatment plan  Outcome: Progressing Towards Goal  WILL ATTEND TREATMENT TEAM SELF REPORTS FEELING \"LESS ANXIOUS AND MORE COMFORTABLE'  Goal: *STG: Complies with medication therapy  Outcome: Progressing Towards Goal  MEDICATION COMPLIANT DENIES SIDE EFFECTS    Goal: *STG: Attends activities and groups  Outcome: Progressing Towards Goal  Remains isolates to self in his room    Goal: *STG: Decreased hallucinations  Outcome: Progressing Towards Goal   ADMITS TO MILD AH stated \"I just want to get the F OUT OF HERE. \"

## 2022-03-28 NOTE — PROGRESS NOTES
Spiritual Care Assessment/Progress Note  [facility]      NAME: Akhil Bernal      MRN: [id number]  AGE: 27 y.o. SEX: male  Yazdanism Affiliation: No preference   Language: English     3/28/2022     Total Time (in minutes): 5     Spiritual Assessment begun in [facility] through conversation with:         []Patient        [] Family    [] Friend(s)        Reason for Consult: Request by patient     Spiritual beliefs: (Please include comment if needed)     [] Identifies with a eric tradition:         [] Supported by a eric community:            [] Claims no spiritual orientation:           [] Seeking spiritual identity:                [] Adheres to an individual form of spirituality:           [x] Not able to assess:                           Identified resources for coping:      [] Prayer                               [] Music                  [] Guided Imagery     [] Family/friends                 [] Pet visits     [] Devotional reading                         [x] Unknown     [] Other                                            Interventions offered during this visit: (See comments for more details)    Patient Interventions: Initial visit           Plan of Care:     [x] Support spiritual and/or cultural needs    [] Support AMD and/or advance care planning process      [] Support grieving process   [] Coordinate Rites and/or Rituals    [] Coordination with community clergy   [] No spiritual needs identified at this time   [] Detailed Plan of Care below (See Comments)  [] Make referral to Music Therapy  [] Make referral to Pet Therapy     [] Make referral to Addiction services  [] Make referral to [facility]  [] Make referral to Spiritual Care Partner  [] No future visits requested        [] Contact Spiritual Care for further referrals     Comments:  received request from nurse earlier today indicating that patient was requesting  visit.    called back to the unit this afternoon to see if visit was emergent, or if it could occur tomorrow, due to several emergencies occurring elsewhere in the hospital.  Nurse agreed, indicating that pt was currently resting. Spiritual Care will plan to visit tomorrow 3/29/22.     Mima [address]  [phone]

## 2022-03-28 NOTE — INTERDISCIPLINARY ROUNDS
Behavioral Health Interdisciplinary Rounds     Patient Name: Gold Saavedra  Age: 27 y.o. Room/Bed:  732/  Primary Diagnosis: <principal problem not specified>   Admission Status: Voluntary Commitment     Readmission within 30 days: no  Power of  in place: no  Patient requires a blocked bed: yes          Reason for blocked bed: MRSA    VTE Prophylaxis: No    Mobility needs/Fall risk: no  Flu Vaccine : no   Nutritional Plan: no  Consults:          Labs/Testing due today?: no    Sleep hours:  8      Participation in Care/Groups:    Medication Compliant?: Yes  PRNS (last 24 hours): Antianxiety    Restraints (last 24 hours):  no     CIWA (range last 24 hours):     COWS (range last 24 hours):      Alcohol screening (AUDIT) completed -         If applicable, date SBIRT discussed in treatment team AND documented:   AUDIT Screen Score:        Document Brief Intervention (corresponds directly with the 5 A's, Ask, Advise, Assess, Assist, and Arrange): At- Risk Patients (Score 7-15 for women; 8-15 for men)  Discuss concern patient is drinking at unhealthy levels known to increase risk of alcohol-related health problems. Is Patient ready to commit to change? If No:   Encourage reflection   Discuss short term and long term health risks of consuming alcohol   Barriers to change   Reaffirm willingness to help / Educational materials provided  If Yes:   Set goal  [employer] provided    Harmful use or Dependence (Score 16 or greater)   Discuss short term and long term health risks of consuming alcohol   Recommendations   Negotiate drinking goal   Recommend addiction specialist/center   Arrange follow-up appointments.     Tobacco - patient is a smoker: Have You Used Tobacco in the Past 30 Days: Yes  Illegal Drugs use: Have You Used Any Illegal Substances Over the Past 12 Months: Yes    24 hour chart check complete: yes ____________________________________________________________________________________________________________    Patient goal(s) for today: communicate needs to staff  Treatment team focus/goals: Assess pt, medication management, discharge planning. Progress note : Pt has decided to leave AMA. [facility] is coming to evaluate. SW spoke with pt about staying to establish tx. Pt declined. Sw spoke with Father about pt.  Pts discharge is TBD    LOS:  4  Expected LOS: TBD    Financial concerns/prescription coverage:    Family contact: Aimee Franco,  [phone]    Family requesting physician contact today:    Discharge plan: TBD  Access to weapons : no       Outpatient provider(s):   Patient's preferred phone number for follow up call :   Patient's preferred e-mail address :  Participating treatment team members: Michele Suarez, Raghu Barrett, MSW, Felicia Almonte, RN, Rosana Abrams, PharmD, Dr. Magdaleno Dawn

## 2022-03-28 NOTE — PROGRESS NOTES
Laboratory Monitoring for Antipsychotics: This patient is currently prescribed the following medication(s):   Current Facility-Administered Medications   Medication Dose Route Frequency    buprenorphine-naloxone (SUBOXONE) 8-2mg SL tablet  1 Tablet SubLINGual DAILY    scopolamine (TRANSDERM-SCOP) 1 mg over 3 days 1 Patch  1 Patch TransDERmal Q72H    risperiDONE (RisperDAL) tablet 1 mg  1 mg Oral BID    traZODone (DESYREL) tablet 100 mg  100 mg Oral QHS    nicotine (NICODERM CQ) 21 mg/24 hr patch 1 Patch  1 Patch TransDERmal DAILY     The following labs have been completed for monitoring of antipsychotics and/or mood stabilizers:    Height, Weight, BMI Estimation  Estimated body mass index is 22.51 kg/m² as calculated from the following:    Height as of this encounter: 182.9 cm (72\"). Weight as of this encounter: 75.3 kg (166 lb). Vital Signs/Blood Pressure  Visit Vitals  /74   Pulse 91   Temp 97.5 °F (36.4 °C)   Resp 16   Ht 182.9 cm (72\")   Wt 75.3 kg (166 lb)   SpO2 100%   BMI 22.51 kg/m²     Renal Function, Hepatic Function and Chemistry  Estimated Creatinine Clearance: 134.6 mL/min (by C-G formula based on SCr of 0.87 mg/dL). Lab Results   Component Value Date/Time    Sodium 136 03/24/2022 11:49 AM    Potassium 3.9 03/24/2022 11:49 AM    Chloride 104 03/24/2022 11:49 AM    CO2 28 03/24/2022 11:49 AM    Anion gap 4 (L) 03/24/2022 11:49 AM    BUN 15 03/24/2022 11:49 AM    Creatinine 0.87 03/24/2022 11:49 AM    BUN/Creatinine ratio 17 03/24/2022 11:49 AM    Bilirubin, total 0.3 03/24/2022 11:49 AM    Protein, total 7.2 03/24/2022 11:49 AM    Albumin 3.6 03/24/2022 11:49 AM    Globulin 3.6 03/24/2022 11:49 AM    A-G Ratio 1.0 (L) 03/24/2022 11:49 AM    ALT (SGPT) 18 03/24/2022 11:49 AM    AST (SGOT) 12 (L) 03/24/2022 11:49 AM    Alk.  phosphatase 69 03/24/2022 11:49 AM     Lab Results   Component Value Date/Time    Glucose 108 (H) 03/24/2022 11:49 AM     Lab Results   Component Value Date/Time Hemoglobin A1c 5.5 03/29/2022 06:49 AM     Hematology  Lab Results   Component Value Date/Time    WBC 8.0 03/24/2022 11:49 AM    RBC 4.86 03/24/2022 11:49 AM    HGB 14.5 03/24/2022 11:49 AM    HCT 43.6 03/24/2022 11:49 AM    MCV 89.7 03/24/2022 11:49 AM    MCH 29.8 03/24/2022 11:49 AM    MCHC 33.3 03/24/2022 11:49 AM    RDW 14.1 03/24/2022 11:49 AM    PLATELET 560 06/81/6860 11:49 AM     Lipids  Lab Results   Component Value Date/Time    Cholesterol, total 150 03/29/2022 06:49 AM    HDL Cholesterol 60 03/29/2022 06:49 AM    LDL, calculated 79.4 03/29/2022 06:49 AM    Triglyceride 53 03/29/2022 06:49 AM    CHOL/HDL Ratio 2.5 03/29/2022 06:49 AM     Thyroid Function  Lab Results   Component Value Date/Time    TSH 1.67 03/24/2022 11:49 AM     Assessment/Plan:  Recommended baseline laboratory monitoring has been completed based on this patient's current medication regimen. No further interventions are needed at this time. Follow-up metabolic monitoring labs should be completed in 3 months (quarterly for the first year of antipsychotic therapy).      Mary Celeste, TABD

## 2022-03-28 NOTE — PROGRESS NOTES
Problem: Falls - Risk of  Goal: *Absence of Falls  Description: Document Racheal Laguerre Fall Risk and appropriate interventions in the flowsheet. Outcome: Progressing Towards Goal  Note: Fall Risk Interventions:            Medication Interventions: Teach patient to arise slowly           Resting in bed with eyes closed, no complaints, no distress noted. Safety measures in place, will continue to monitor.

## 2022-03-28 NOTE — DISCHARGE SUMMARY
DISCHARGE SUMMARY    Some parts of the discharge summary are from the initial Psychiatric interview that was done on admission by the admitting psychiatrist.     Date of Admission: 3/24/2022    Date of Discharge: 3/28/2022     TYPE OF DISCHARGE:     AMA - YES  RELEASED BY THE TDO COURT - NO     REGULAR - NO    ADMISSION EVALUATION:  CHIEF COMPLAINT:  Luciano Saleh tricked me in to coming here. \"     HISTORY OF PRESENT ILLNESS:  The patient is a 78-year-old male who is currently admitted to the [facility] Unit at [facility] and was involuntarily committed to the hospital today. He was brought in on a TDO with a report from the family of him having been increasingly agitated and having bizarre behavior. He reports that for the past 3 days, he has been having thoughts of suicide and over the last 10 days, he has been hearing voices, which can get loud and quite intrusive at times. He had reported that when he hears the voices, he wants to end his life because they are so loud and insistent. He has been using methamphetamine for the past year and estimates that he was using about 25 dollars worth of methamphetamine at least 4 or 5 times a week. His last use was yesterday. He admits that the use of meth has been most likely the cause of his hallucinations and understands the relationship with this. States that he has not been going to work for the last 10 days, has been sleeping poorly and has had little appetite. He was mildly agitated during the interview, repeatedly saying under his breath that he had been tricked in to coming here and that he wanted to leave. However, he was redirectable and cooperative with most of the interview.   States that he feels paranoid and thinks that there are people out to get him.     PAST MEDICAL HISTORY:  Reviewed as per the history and physical exam.             Past Medical History:   Diagnosis Date    Crack cocaine use      Pneumomediastinum ([address]       Prior to Admission medications    Medication Sig Start Date End Date Taking? Authorizing Provider   mupirocin (BACTROBAN) 2 % ointment Apply  to affected area daily. Patient not taking: Reported on 3/26/2022 3/9/22     PREMA Sandoval   famotidine (PEPCID) 20 mg tablet Take 1 Tab by mouth two (2) times a day. Patient not taking: Reported on 3/26/2022 6/6/18     Josiah Jimenez MD             Vitals:     03/27/22 1259 03/27/22 1650 03/27/22 2023 03/28/22 0825   BP: 135/79 130/65 105/63 111/75   Pulse: 77 81 (!) 54 68   Resp: 16 16 16 16   Temp: 98 °F (36.7 °C) 97.8 °F (36.6 °C) 98 °F (36.7 °C) 97.8 °F (36.6 °C)   SpO2:   97% 98% 98%   Weight:           Height:                    Lab Results   Component Value Date/Time     WBC 8.0 03/24/2022 11:49 AM     HGB 14.5 03/24/2022 11:49 AM     HCT 43.6 03/24/2022 11:49 AM     PLATELET 776 28/25/5149 11:49 AM     MCV 89.7 03/24/2022 11:49 AM            Lab Results   Component Value Date/Time     Sodium 136 03/24/2022 11:49 AM     Potassium 3.9 03/24/2022 11:49 AM     Chloride 104 03/24/2022 11:49 AM     CO2 28 03/24/2022 11:49 AM     Anion gap 4 (L) 03/24/2022 11:49 AM     Glucose 108 (H) 03/24/2022 11:49 AM     BUN 15 03/24/2022 11:49 AM     Creatinine 0.87 03/24/2022 11:49 AM     BUN/Creatinine ratio 17 03/24/2022 11:49 AM     GFR est AA >60 03/24/2022 11:49 AM     GFR est non-AA >60 03/24/2022 11:49 AM     Calcium 9.0 03/24/2022 11:49 AM     Bilirubin, total 0.3 03/24/2022 11:49 AM     Alk.  phosphatase 69 03/24/2022 11:49 AM     Protein, total 7.2 03/24/2022 11:49 AM     Albumin 3.6 03/24/2022 11:49 AM     Globulin 3.6 03/24/2022 11:49 AM     A-G Ratio 1.0 (L) 03/24/2022 11:49 AM     ALT (SGPT) 18 03/24/2022 11:49 AM     AST (SGOT) 12 (L) 03/24/2022 11:49 AM      No results found for: VALF2, VALAC, VALP, VALPR, DS6, CRBAM, CRBAMP, CARB2, XCRBAM  No results found for: LITHM  RADIOLOGY REPORTS:(reviewed/updated 3/28/2022)  No results found.     PAST PSYCHIATRIC HISTORY:  The patient denies any prior history of inpatient hospitalizations or suicide attempts. Denies any prior history of similar symptoms as today. States that he started using methamphetamine about a year ago, but has experimented with other drugs in the past.     PSYCHOSOCIAL HISTORY:  Currently, the patient lives in [place] with both of his parents. 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 He has never been , does not have any children, and is not in a relationship at the present time. Denies any significant legal stressors at the present time.     MENTAL STATUS EXAM:  The patient is a young  male who is dressed in hospital apparel. He is poorly groomed and has not shaved or combed his hair in many days. He makes limited eye contact and was mildly agitated during the interview. He kept muttering under his breath. His speech is spontaneous and coherent, although decreased in output. Mood is reported as being upset and his affect is blunted. Auditory hallucinations are present as described above. Paranoid delusions of persecution and reference are also present. His thought process is mildly disorganized. Cognitively, he is awake and alert, oriented to time, place and person. Intelligence is average. Memory is intact and fund of knowledge is adequate. Insight is poor and judgment is poor.     ASSESSMENT AND PLAN/DIAGNOSIS:  Unspecified psychosis, rule out substance-induced psychosis, methamphetamine use disorder, severe.     At the present time, we will continue his inpatient stay. He will be provided with support and attend groups. Psychotropic medications will be adjusted as needed.   His strengths include stable housing and support from his family.  Rima Reynoso 60:  Patient was admitted to the inpatient psychiatry unit for acute psychiatric stabilization in regards to symptomatology as described in the HPI above and placed on Q15 minute checks and withdrawal precautions. While on the unit Edvin Pereira was involved in individual, group, occupational and milieu therapy. Edvin Pereira  was started back on the patients usual medication regimen as well as PRN medications. Patient requested to be discharged AMA. Edvin Pereira was discharged AMA at their request. Patient was considered competent to make this decision and did not appear to be a danger to themselves or to others. I discussed the risks of withdrawal with Edvin Pereira including seizues, and possible death. Patient verbalized understanding and still want's to leave. There was no behavior indicating instability of their psychiatric symptoms and the patient appears to be able to make a reasonable judgment regarding their own care, manipulate this information, and indicate an appropriate choice. Edvin Pereira was discharged at their request with follow up to be arranged. No prescriptions were provided at the time of discharge. Patient is fully aware of the risks and benefits of staying in the hosptal for completion of treatment tvs. leaving the hospital AMA. Patient had expressed a desire to follow up with appointments and remains motivated to be in treatment after discharge. The patient verbalized understanding of discharge instructions. DISCHARGE DIAGNOSIS:    ICD-10-CM ICD-9-CM    1. Suicidal ideations  R45.851 V62.84           Current Discharge Medication List      STOP taking these medications       buprenorphine-naloxone (SUBOXONE) 8-2 mg film sublingaul film Comments:   Reason for Stopping:         mupirocin (BACTROBAN) 2 % ointment Comments:   Reason for Stopping:                 Follow-up Information     Follow up With Specialties Details Why Contact Info    Sallie Ortiz MD Obstetrics & Gynecology, Gynecology, Obstetrics   [facility]  [address]  [address] [place] [address]  [phone]          WOUND CARE: none needed. PROGNOSIS:   Fair / Guarded based on nature of patient's pathology/ies and treatment compliance issues. Prognosis is greatly dependent upon patient's ability to  follow up on psychiatric/psychotherapy appointments as well as to comply with psychiatric medications as prescribed which the individual has declined to do. Discharge was postponed for continued treatment in the hospital. Please see subsequent DC summary for further details.

## 2022-03-28 NOTE — PROGRESS NOTES
Pharmacist Discharge Medication Reconciliation    Discharging Provider: Dr. Prakash Washington PMH:   Past Medical History:   Diagnosis Date    Crack cocaine use     Pneumomediastinum [facility])      Chief Complaint for this Admission:   Chief Complaint   Patient presents with    Suicidal     Allergies: Patient has no known allergies.     Discharge Medications:   Current Discharge Medication List        STOP taking these medications       buprenorphine-naloxone (SUBOXONE) 8-2 mg film sublingaul film Comments:   Reason for Stopping:         mupirocin (BACTROBAN) 2 % ointment Comments:   Reason for Stopping:             The patient's chart, MAR and AVS were reviewed by Otilio Poe, MEGAN.

## 2022-03-28 NOTE — BH NOTES
Psychiatric Progress Note    Patient: Corey Mata MRN: [id number]  SSN: [id number]    YOB: 1995  Age: 27 y.o. Sex: male      Admit Date: 3/24/2022    LOS: 4 days     Subjective:     Corey Mata is somewhat improved. Says he was able to sleep well last night and woke up hearing voices. However they have been much reduced in frequency and intensity and don't bother him as much. He was calm and cooperative during the interview but did not remember meeting this author. Says he would like to reduce his dosage of Suboxone and had only been taking it once a day prior to admission. Denies any SI or plan. Remains motivated to get in to an IOP. At the present time the patient Corey Mata remains compliant with taking medications. Denies any adverse events from taking them and feels they have been beneficial.       Objective:     Vitals:    03/27/22 1259 03/27/22 1650 03/27/22 2023 03/28/22 0825   BP: 135/79 130/65 105/63 111/75   Pulse: 77 81 (!) 54 68   Resp: 16 16 16 16   Temp: 98 °F (36.7 °C) 97.8 °F (36.6 °C) 98 °F (36.7 °C) 97.8 °F (36.6 °C)   SpO2:  97% 98% 98%   Weight:       Height:          Mental Status Exam:   General appearance: moderately groomed, psychomotor activity is WNL  Eye contact: poor  Speech: Spontaneous and coherent  Affect : guarded  Mood: \"OK\"  Thought Process: Logical, goal directed  Perception: +AH   Thought Content: Denies SI/plan/intent, denies HI/plan/intent  Insight: poor  Judgment: poor  Cognition: Intact grossly.     MEDICATIONS:  Current Facility-Administered Medications   Medication Dose Route Frequency    buprenorphine-naloxone (SUBOXONE) 8-2mg SL tablet  1 Tablet SubLINGual BID    scopolamine (TRANSDERM-SCOP) 1 mg over 3 days 1 Patch  1 Patch TransDERmal Q72H    risperiDONE (RisperDAL) tablet 1 mg  1 mg Oral BID    traZODone (DESYREL) tablet 100 mg  100 mg Oral QHS    nicotine (NICODERM CQ) 21 mg/24 hr patch 1 Patch  1 Patch TransDERmal DAILY    nicotine buccal (POLACRILEX) lozenge 2 mg  2 mg Oral Q2H PRN    OLANZapine (ZyPREXA) tablet 5 mg  5 mg Oral Q6H PRN    haloperidol lactate (HALDOL) injection 5 mg  5 mg IntraMUSCular Q6H PRN    benztropine (COGENTIN) tablet 1 mg  1 mg Oral BID PRN    diphenhydrAMINE (BENADRYL) injection 50 mg  50 mg IntraMUSCular BID PRN    hydrOXYzine HCL (ATARAX) tablet 50 mg  50 mg Oral TID PRN    LORazepam (ATIVAN) injection 1 mg  1 mg IntraMUSCular Q4H PRN    acetaminophen (TYLENOL) tablet 650 mg  650 mg Oral Q4H PRN    magnesium hydroxide (MILK OF MAGNESIA) 400 mg/5 mL oral suspension 30 mL  30 mL Oral DAILY PRN    ibuprofen (MOTRIN) tablet 400 mg  400 mg Oral Q8H PRN        Assessment:   Unspecified psychosis, rule out substance-induced psychosis, methamphetamine use disorder, severe. Plan:     Continue current care  Disposition planning with social work  Decreased dosage of Suboxone to once daily. He is requesting to be discharged AMA today. Denies any SI or plan. Denies any AH or VH but says he is \"going to arrange all my treatment for myself\".      Signed By: Jh Larson MD     March 28, 2022

## 2022-03-28 NOTE — BH NOTES
TRANSFER - OUT REPORT:    Verbal report given to Randa on Jose Market  being transferred to [facility] for routine progression of care       Report consisted of patients Situation, Background, Assessment and   Recommendations(SBAR). Information from the following report(s) SBAR was reviewed with the receiving nurse. Opportunity for questions and clarification was provided.       Patient transported with:   Registered Nurse

## 2022-03-29 LAB
BACTERIA SPEC CULT: NORMAL
BACTERIA SPEC CULT: NORMAL
CHOLEST SERPL-MCNC: 150 MG/DL
EST. AVERAGE GLUCOSE BLD GHB EST-MCNC: 111 MG/DL
HBA1C MFR BLD: 5.5 % (ref 4–5.6)
HDLC SERPL-MCNC: 60 MG/DL
HDLC SERPL: 2.5 {RATIO} (ref 0–5)
LDLC SERPL CALC-MCNC: 79.4 MG/DL (ref 0–100)
SERVICE CMNT-IMP: NORMAL
TRIGL SERPL-MCNC: 53 MG/DL (ref ?–150)
VLDLC SERPL CALC-MCNC: 10.6 MG/DL

## 2022-03-29 PROCEDURE — 65220000003 HC RM SEMIPRIVATE PSYCH

## 2022-03-29 PROCEDURE — 83036 HEMOGLOBIN GLYCOSYLATED A1C: CPT

## 2022-03-29 PROCEDURE — 74011250636 HC RX REV CODE- 250/636: Performed by: PSYCHIATRY & NEUROLOGY

## 2022-03-29 PROCEDURE — 36415 COLL VENOUS BLD VENIPUNCTURE: CPT

## 2022-03-29 PROCEDURE — 74011250637 HC RX REV CODE- 250/637: Performed by: NURSE PRACTITIONER

## 2022-03-29 PROCEDURE — 74011250637 HC RX REV CODE- 250/637: Performed by: PSYCHIATRY & NEUROLOGY

## 2022-03-29 PROCEDURE — 80061 LIPID PANEL: CPT

## 2022-03-29 RX ORDER — ONDANSETRON 4 MG/1
4 TABLET, ORALLY DISINTEGRATING ORAL
Status: DISCONTINUED | OUTPATIENT
Start: 2022-03-29 | End: 2022-03-30 | Stop reason: HOSPADM

## 2022-03-29 RX ADMIN — TRAZODONE HYDROCHLORIDE 100 MG: 100 TABLET ORAL at 21:26

## 2022-03-29 RX ADMIN — BUPRENORPHINE HYDROCHLORIDE AND NALOXONE HYDROCHLORIDE DIHYDRATE 1 TABLET: 8; 2 TABLET SUBLINGUAL at 10:53

## 2022-03-29 RX ADMIN — RISPERIDONE 1 MG: 1 TABLET, FILM COATED ORAL at 10:53

## 2022-03-29 RX ADMIN — ONDANSETRON 4 MG: 4 TABLET, ORALLY DISINTEGRATING ORAL at 09:52

## 2022-03-29 RX ADMIN — RISPERIDONE 1 MG: 1 TABLET, FILM COATED ORAL at 17:51

## 2022-03-29 NOTE — BH NOTES
GROUP THERAPY PROGRESS NOTE    Patient did not participate in psychotherapy group.     Jarad KNOX, [facility]

## 2022-03-29 NOTE — BH NOTES
GROUP THERAPY PROGRESS NOTE    Patient did not participate in recreational therapy group.     ABILIO Recinos, [facility]

## 2022-03-29 NOTE — BH NOTES
Psychiatric Progress Note    Patient: Edvin Pereira MRN: [id number]  SSN: [id number]    YOB: 1995  Age: 27 y.o. Sex: male      Admit Date: 3/24/2022    LOS: 5 days     Subjective:     Edvin Pereira complains of nausea and vomiting. This started earlier in the morning and he vomited at least 3 times. He was given Zofran which seems to have helped. His stay in the hospital was continued as he is committed voluntarily. Says he is agreeable to staying longer. Family wants him to go to a rehab but he remains ambivalent about this. Calm and cooperative during the interview. Denies any AH today and says he last heard the yesterday afternoon. Denies any SI or plan. At the present time the patient Edvin Pereira remains compliant with taking medications. Denies any adverse events from taking them and feels they have been beneficial.       Objective:     Vitals:    03/27/22 1650 03/27/22 2023 03/28/22 0825 03/29/22 0709   BP: 130/65 105/63 111/75 103/74   Pulse: 81 (!) 54 68 91   Resp: 16 16 16 16   Temp: 97.8 °F (36.6 °C) 98 °F (36.7 °C) 97.8 °F (36.6 °C) 97.5 °F (36.4 °C)   SpO2: 97% 98% 98% 100%   Weight:       Height:          Mental Status Exam:   General appearance: moderately groomed, psychomotor activity is WNL  Eye contact: poor  Speech: Spontaneous and coherent  Affect : guarded  Mood: \"OK\"  Thought Process: Logical, goal directed  Perception: +AH   Thought Content: Denies SI/plan/intent, denies HI/plan/intent  Insight: poor  Judgment: poor  Cognition: Intact grossly.     MEDICATIONS:  Current Facility-Administered Medications   Medication Dose Route Frequency    ondansetron (ZOFRAN ODT) tablet 4 mg  4 mg Oral Q6H PRN    buprenorphine-naloxone (SUBOXONE) 8-2mg SL tablet  1 Tablet SubLINGual DAILY    scopolamine (TRANSDERM-SCOP) 1 mg over 3 days 1 Patch  1 Patch TransDERmal Q72H    risperiDONE (RisperDAL) tablet 1 mg  1 mg Oral BID    traZODone (DESYREL) tablet 100 mg  100 mg Oral QHS    nicotine (NICODERM CQ) 21 mg/24 hr patch 1 Patch  1 Patch TransDERmal DAILY    nicotine buccal (POLACRILEX) lozenge 2 mg  2 mg Oral Q2H PRN    OLANZapine (ZyPREXA) tablet 5 mg  5 mg Oral Q6H PRN    haloperidol lactate (HALDOL) injection 5 mg  5 mg IntraMUSCular Q6H PRN    benztropine (COGENTIN) tablet 1 mg  1 mg Oral BID PRN    diphenhydrAMINE (BENADRYL) injection 50 mg  50 mg IntraMUSCular BID PRN    hydrOXYzine HCL (ATARAX) tablet 50 mg  50 mg Oral TID PRN    LORazepam (ATIVAN) injection 1 mg  1 mg IntraMUSCular Q4H PRN    acetaminophen (TYLENOL) tablet 650 mg  650 mg Oral Q4H PRN    magnesium hydroxide (MILK OF MAGNESIA) 400 mg/5 mL oral suspension 30 mL  30 mL Oral DAILY PRN    ibuprofen (MOTRIN) tablet 400 mg  400 mg Oral Q8H PRN        Assessment:   Unspecified psychosis, rule out substance-induced psychosis, methamphetamine use disorder, severe. Plan:     Continue current care  Disposition planning with social work  Group and milieu therapy  I certify that this patients inpatient psychiatric hospital services furnished since the previous certification were, and continue to be, required for treatment that could reasonably be expected to improve the patient's condition, or for diagnostic study, and that the patient continues to need, on a daily basis, active treatment furnished directly by or requiring the supervision of inpatient psychiatric facility personnel. In addition, the hospital records show that services furnished were intensive treatment services, admission or related services, or equivalent services.      Signed By: Nuria Moore MD     March 29, 2022

## 2022-03-29 NOTE — PROGRESS NOTES
Problem: Falls - Risk of  Goal: *Absence of Falls  Description: Document Dennie Oak Fall Risk and appropriate interventions in the flowsheet.   Outcome: Progressing Towards Goal  Note: Fall Risk Interventions:            Medication Interventions: Teach patient to arise slowly                 Pt appears asleep in bed, NAD, even respirations, will continue to monitor q15min

## 2022-03-29 NOTE — BH NOTES
PRN Medication Documentation    Specific patient behavior that led to need for PRN medication: nausea/vomiting   Staff interventions attempted prior to PRN being given: education   PRN medication given: Zofran 4mg PO given @ 3144  Patient response/effectiveness of PRN medication: medication effective

## 2022-03-29 NOTE — PROGRESS NOTES
Spiritual Care Assessment/Progress Note  [facility]      NAME: Emani Mann      MRN: [id number]  AGE: 27 y.o.  SEX: male  Denominational Affiliation: No preference   Language: English     3/29/2022     Total Time (in minutes): 50     Spiritual Assessment begun in [address] through conversation with:         [x]Patient        [] Family    [] Friend(s)        Reason for Consult: Initial/Spiritual assessment, patient floor     Spiritual beliefs: (Please include comment if needed)     [x] Identifies with a eric tradition: Samaritan        [] Supported by a eric community:            [] Claims no spiritual orientation:           [] Seeking spiritual identity:                [] Adheres to an individual form of spirituality:           [] Not able to assess:                           Identified resources for coping:      [] Prayer                               [] Music                  [] Guided Imagery     [x] Family/friends                 [] Pet visits     [] Devotional reading                         [] Unknown     [] Other:                                              Interventions offered during this visit: (See comments for more details)    Patient Interventions: Affirmation of emotions/emotional suffering,Affirmation of eric,Catharsis/review of pertinent events in supportive environment,Coping skills reviewed/reinforced,Iconic (affirming the presence of God/Higher Power),Normalization of emotional/spiritual concerns           Plan of Care:     [] Support spiritual and/or cultural needs    [] Support AMD and/or advance care planning process      [] Support grieving process   [] Coordinate Rites and/or Rituals    [] Coordination with community clergy   [] No spiritual needs identified at this time   [] Detailed Plan of Care below (See Comments)  [] Make referral to Music Therapy  [] Make referral to Pet Therapy     [] Make referral to Addiction services  [] Make referral to [facility]  [] Make referral to Spiritual Care Partner  [] No future visits requested        [x] Contact Spiritual Care for further referrals     Comments:  visit for initial spiritual assessment. Met with patient privately in dayroom area of unit. Provided spiritual presence and listening as he spoke of his present thoughts, feelings, and concerns. Spoke of his health and the events leading to this admission. Spoke of personal issues and struggles. Main struggle at this time is making decisions concerning his future. Described his current situation, his hopes, needs, and desires for his life and future, and the different possibilities and choices he has before him. Described good support from his father. Spoke of his eric and application of his eric to his life, situation, and hopes. He appeared comforted and encouraged as a result of this visit and expressed gratitude for this visit. Informed him of availability of  and pastoral care services. Rev.  Zenaida France MDiv, [facility], [facility]   paging service: [phone])

## 2022-03-29 NOTE — BH NOTES
GROUP THERAPY PROGRESS NOTE    Patient did not participate in SA and coping group.     Loran Eastern MSW, HP-A

## 2022-03-29 NOTE — PROGRESS NOTES
Problem: Altered Thought Process (Adult/Pediatric)  Goal: *STG: Participates in treatment plan  3/29/2022 1623 by Power Quintana LPN  Outcome: Progressing Towards Goal  3/29/2022 1621 by Power Quintana LPN  Outcome: Progressing Towards Goal  Goal: *STG: Seeks staff when feelings of anxiety and fear arise  3/29/2022 1623 by Power Quintana LPN  Outcome: Progressing Towards Goal  3/29/2022 1621 by Power Quintana LPN  Outcome: Progressing Towards Goal  Goal: *STG: Complies with medication therapy  Outcome: Progressing Towards Goal  Goal: *STG: Attends activities and groups  Outcome: Progressing Towards Goal  Goal: *STG: Decreased delusional thinking  Outcome: Progressing Towards Goal  Goal: *STG: Decreased hallucinations  Outcome: Progressing Towards Goal  Goal: *STG: Demonstrates ability to understand and use improved judgment in daily activities and relationships  Outcome: Progressing Towards Goal  Goal: Interventions  Outcome: Progressing Towards Goal   Pt is visible on unit  No voiced complaints or acute distress  at present

## 2022-03-29 NOTE — PROGRESS NOTES
Problem: Altered Thought Process (Adult/Pediatric)  Goal: *STG: Participates in treatment plan  Outcome: Progressing Towards Goal  Goal: *STG: Seeks staff when feelings of anxiety and fear arise  Outcome: Progressing Towards Goal  Goal: *STG: Complies with medication therapy  Outcome: Progressing Towards Goal  Goal: *STG: Attends activities and groups  Outcome: Progressing Towards Goal  Goal: *STG: Decreased delusional thinking  Outcome: Progressing Towards Goal  Goal: *STG: Decreased hallucinations  Outcome: Progressing Towards Goal  Goal: *STG: Demonstrates ability to understand and use improved judgment in daily activities and relationships  Outcome: Progressing Towards Goal     Problem: Falls - Risk of  Goal: *Absence of Falls  Description: Document Venu Fall Risk and appropriate interventions in the flowsheet. Outcome: Progressing Towards Goal  Note: Fall Risk Interventions:            Medication Interventions: Teach patient to arise slowly                   Problem: Patient Education: Go to Patient Education Activity  Goal: Patient/Family Education  Outcome: Progressing Towards Goal     Problem: Altered Thought Process (Adult/Pediatric)  Goal: *STG: Participates in treatment plan  Outcome: Progressing Towards Goal  Goal: *STG: Complies with medication therapy  Outcome: Progressing Towards Goal  Goal: *STG: Attends activities and groups  Outcome: Progressing Towards Goal  Goal: *STG: Decreased hallucinations  Outcome: Progressing Towards Goal  Note: Vomiting received medication for vomiting with good results. Currently denies A/V hallucinations. Reports he experiences voices when using crack. Currently feeling better and working on discharge plans.

## 2022-03-29 NOTE — BH NOTES
GROUP THERAPY PROGRESS NOTE    Patient did not participate in Healthy Living and Wellness Education Group.     Jamee Hunter, Social Work Case Management

## 2022-03-30 VITALS
HEIGHT: 72 IN | OXYGEN SATURATION: 97 % | TEMPERATURE: 97.8 F | HEART RATE: 76 BPM | DIASTOLIC BLOOD PRESSURE: 69 MMHG | SYSTOLIC BLOOD PRESSURE: 104 MMHG | WEIGHT: 166 LBS | BODY MASS INDEX: 22.48 KG/M2 | RESPIRATION RATE: 16 BRPM

## 2022-03-30 PROCEDURE — 74011250637 HC RX REV CODE- 250/637: Performed by: PSYCHIATRY & NEUROLOGY

## 2022-03-30 PROCEDURE — 74011250636 HC RX REV CODE- 250/636: Performed by: PSYCHIATRY & NEUROLOGY

## 2022-03-30 RX ORDER — PERPHENAZINE 4 MG/1
4 TABLET, FILM COATED ORAL 2 TIMES DAILY
Qty: 60 TABLET | Refills: 0 | Status: SHIPPED | OUTPATIENT
Start: 2022-03-30

## 2022-03-30 RX ADMIN — BUPRENORPHINE HYDROCHLORIDE AND NALOXONE HYDROCHLORIDE DIHYDRATE 1 TABLET: 8; 2 TABLET SUBLINGUAL at 08:04

## 2022-03-30 RX ADMIN — RISPERIDONE 1 MG: 1 TABLET, FILM COATED ORAL at 08:04

## 2022-03-30 NOTE — BH NOTES
GROUP THERAPY PROGRESS NOTE  Pt actively participated in [employer] group.    Scott Tubbs, ABILIO, QM-A

## 2022-03-30 NOTE — BH NOTES
GROUP THERAPY PROGRESS NOTE    Patient is participating in substance use group. Group time: 45 minutes    Personal goal for participation: To gain an understanding of how substances can affect the brain and body. Goal orientation: Personal    Group therapy participation: Active     Therapeutic interventions reviewed and discussed:  Group members were guided through developing an understanding of the effects of alcohol and other substances on the brain and body. Handouts provided. Impression of participation: Pt was present and engaged in group discussion. Pt added to group topic. Pts mood is stable. Pt has diosplayed willingness for follow up tx. Pt is set to participate in inpationet tx at the . Pt will discharge today.        Leonardo KNOX, [facility]

## 2022-03-30 NOTE — PROGRESS NOTES
Problem: Falls - Risk of  Goal: *Absence of Falls  Description: Document Virlinda Gamma Fall Risk and appropriate interventions in the flowsheet.   Outcome: Progressing Towards Goal  Note: Fall Risk Interventions:            Medication Interventions: Teach patient to arise slowly            Pt appears asleep in bed, NAD, even respirations, will continue to monitor q15min

## 2022-03-30 NOTE — INTERDISCIPLINARY ROUNDS
Behavioral Health Interdisciplinary Rounds     Patient Name: Amanda Crooks  Age: 27 y.o. Room/Bed:  731/02  Primary Diagnosis: <principal problem not specified>   Admission Status: Voluntary     Readmission within 30 days: no  Power of  in place: no  Patient requires a blocked bed: yes         Reason for blocked bed: MRSA    VTE Prophylaxis: No    Mobility needs/Fall risk: no  Flu Vaccine : no   Nutritional Plan: no  Consults:          Labs/Testing due today?: no    Sleep hours: 7       Participation in Care/Groups:  yes  Medication Compliant?: Yes  PRNS (last 24 hours): antinausea    Restraints (last 24 hours):  no     CIWA (range last 24 hours):     COWS (range last 24 hours):      Alcohol screening (AUDIT) completed -         If applicable, date SBIRT discussed in treatment team AND documented:   AUDIT Screen Score:        Document Brief Intervention (corresponds directly with the 5 A's, Ask, Advise, Assess, Assist, and Arrange): At- Risk Patients (Score 7-15 for women; 8-15 for men)  Discuss concern patient is drinking at unhealthy levels known to increase risk of alcohol-related health problems. Is Patient ready to commit to change? If No:   Encourage reflection   Discuss short term and long term health risks of consuming alcohol   Barriers to change   Reaffirm willingness to help / Educational materials provided  If Yes:   Set goal  Capptain provided    Harmful use or Dependence (Score 16 or greater)   Discuss short term and long term health risks of consuming alcohol   Recommendations   Negotiate drinking goal   Recommend addiction specialist/center   Arrange follow-up appointments.     Tobacco - patient is a smoker: Have You Used Tobacco in the Past 30 Days: Yes  Illegal Drugs use: Have You Used Any Illegal Substances Over the Past 12 Months: Yes    24 hour chart check complete: yes ____________________________________________________________________________________________________________    Patient goal(s) for today: Discharge   Treatment team focus/goals: assess pt, medication management, discharge   Progress note : Pts mood is stable. Pts medication was adjusted. Pt will be discharged today. Sw provided pt with resources for recovery.      LOS:  6  Expected LOS:     Financial concerns/prescription coverage:    Family contact: Bill Álvarez,   [phone]     Family requesting physician contact today:    Discharge plan: Pt will  Discharge to inpatient tx  Access to weapons :  no       [facility]  Patient's preferred phone number for follow up call : [phone]  Patient's preferred e-mail address :  Participating treatment team members: Janey Tirado, MSW, Daljit Rico, RN, Vane Bajwa, PharmD, Dr. Serrano

## 2022-03-30 NOTE — BH NOTES
Behavioral Health Transition Record to Provider    Patient Name: Dinora Hernandez  YOB: 1995  Medical Record Number: [id number]  Date of Admission: 3/24/2022  Date of Discharge: 3/30/2022    Attending Provider: No att. providers found  Discharging Provider: Doris Orozco   To contact this individual call [phone] and ask the  to page. If unavailable, ask to be transferred to [address] [place] [address] Provider on call. [facility] Provider will be available on call 24/7 and during holidays. Primary Care Provider: Mercy Quiñones MD    No Known Allergies    Reason for Admission:   CHIEF COMPLAINT:  \"They tricked me in to coming here. \"     HISTORY OF PRESENT ILLNESS:  The patient is a 64-year-old male who is currently admitted to the [address] [place] [address] Unit at [facility] and was involuntarily committed to the hospital today. Pedro Ivan was brought in on a TDO with a report from the family of him having been increasingly agitated and having bizarre behavior. Pedro Ivan reports that for the past 3 days, he has been having thoughts of suicide and over the last 10 days, he has been hearing voices, which can get loud and quite intrusive at times. Pedro Ivan had reported that when he hears the voices, he wants to end his life because they are so loud and insistent. Pedro Ivan has been using methamphetamine for the past year and estimates that he was using about 25 dollars worth of methamphetamine at least 4 or 5 times a week.  His last use was yesterday. Pedro Ivan admits that the use of meth has been most likely the cause of his hallucinations and understands the relationship with this.  States that he has not been going to work for the last 10 days, has been sleeping poorly and has had little appetite.  He was mildly agitated during the interview, repeatedly saying under his breath that he had been tricked in to coming here and that he wanted to leave. Alice Avila, he was redirectable and cooperative with most of the interview.  States that he feels paranoid and thinks that there are people out to get him.       Admission Diagnosis: Psychosis, unspecified psychosis type ([facility] [F29]    * No surgery found *    Results for orders placed or performed during the hospital encounter of 03/24/22   URINE CULTURE HOLD SAMPLE    Specimen: Serum; Urine   Result Value Ref Range    Urine culture hold        Urine on hold in Microbiology dept for 2 days. If unpreserved urine is submitted, it cannot be used for addtional testing after 24 hours, recollection will be required. COVID-19 WITH INFLUENZA A/B   Result Value Ref Range    SARS-CoV-2 by PCR Not detected NOTD      Influenza A by PCR Not detected NOTD      Influenza B by PCR Not detected NOTD     CULTURE, MRSA    Specimen: Nares; Nasal   Result Value Ref Range    Special Requests: NO SPECIAL REQUESTS      Culture result: MRSA NOT PRESENT      Culture result:        Screening of patient nares for MRSA is for surveillance purposes and, if positive, to facilitate isolation considerations in high risk settings. It is not intended for automatic decolonization interventions per se as regimens are not sufficiently effective to warrant routine use. CBC WITH AUTOMATED DIFF   Result Value Ref Range    WBC 8.0 4.1 - 11.1 K/uL    RBC 4.86 4.10 - 5.70 M/uL    HGB 14.5 12.1 - 17.0 g/dL    HCT 43.6 36.6 - 50.3 %    MCV 89.7 80.0 - 99.0 FL    MCH 29.8 26.0 - 34.0 PG    MCHC 33.3 30.0 - 36.5 g/dL    RDW 14.1 11.5 - 14.5 %    PLATELET 465 303 - 998 K/uL    MPV 8.5 (L) 8.9 - 12.9 FL    NRBC 0.0 0  WBC    ABSOLUTE NRBC 0.00 0.00 - 0.01 K/uL    NEUTROPHILS 57 32 - 75 %    LYMPHOCYTES 33 12 - 49 %    MONOCYTES 6 5 - 13 %    EOSINOPHILS 3 0 - 7 %    BASOPHILS 1 0 - 1 %    IMMATURE GRANULOCYTES 0 0.0 - 0.5 %    ABS. NEUTROPHILS 4.6 1.8 - 8.0 K/UL    ABS. LYMPHOCYTES 2.7 0.8 - 3.5 K/UL    ABS. MONOCYTES 0.5 0.0 - 1.0 K/UL    ABS. EOSINOPHILS 0.2 0.0 - 0.4 K/UL    ABS. BASOPHILS 0.0 0.0 - 0.1 K/UL    ABS. IMM. GRANS. 0.0 0.00 - 0.04 K/UL    DF AUTOMATED     METABOLIC PANEL, COMPREHENSIVE   Result Value Ref Range    Sodium 136 136 - 145 mmol/L    Potassium 3.9 3.5 - 5.1 mmol/L    Chloride 104 97 - 108 mmol/L    CO2 28 21 - 32 mmol/L    Anion gap 4 (L) 5 - 15 mmol/L    Glucose 108 (H) 65 - 100 mg/dL    BUN 15 6 - 20 MG/DL    Creatinine 0.87 0.70 - 1.30 MG/DL    BUN/Creatinine ratio 17 12 - 20      GFR est AA >60 >60 ml/min/1.73m2    GFR est non-AA >60 >60 ml/min/1.73m2    Calcium 9.0 8.5 - 10.1 MG/DL    Bilirubin, total 0.3 0.2 - 1.0 MG/DL    ALT (SGPT) 18 12 - 78 U/L    AST (SGOT) 12 (L) 15 - 37 U/L    Alk.  phosphatase 69 45 - 117 U/L    Protein, total 7.2 6.4 - 8.2 g/dL    Albumin 3.6 3.5 - 5.0 g/dL    Globulin 3.6 2.0 - 4.0 g/dL    A-G Ratio 1.0 (L) 1.1 - 2.2     ETHYL ALCOHOL   Result Value Ref Range    ALCOHOL(ETHYL),SERUM <10 <10 MG/DL   URINALYSIS W/MICROSCOPIC   Result Value Ref Range    Color YELLOW/STRAW      Appearance CLEAR CLEAR      Specific gravity 1.024 1.003 - 1.030      pH (UA) 7.0 5.0 - 8.0      Protein Negative NEG mg/dL    Glucose Negative NEG mg/dL    Ketone Negative NEG mg/dL    Bilirubin Negative NEG      Blood Negative NEG      Urobilinogen 0.2 0.2 - 1.0 EU/dL    Nitrites Negative NEG      Leukocyte Esterase Negative NEG      WBC 0-4 0 - 4 /hpf    RBC 0-5 0 - 5 /hpf    Epithelial cells FEW FEW /lpf    Bacteria Negative NEG /hpf    Hyaline cast 0-2 0 - 5 /lpf   DRUG SCREEN, URINE   Result Value Ref Range    AMPHETAMINES Positive (A) NEG      BARBITURATES Negative NEG      BENZODIAZEPINES Negative NEG      COCAINE Negative NEG      METHADONE Negative NEG      OPIATES Negative NEG      PCP(PHENCYCLIDINE) Negative NEG      THC (TH-CANNABINOL) Negative NEG      Drug screen comment (NOTE)    TSH 3RD GENERATION   Result Value Ref Range    TSH 1.67 0.36 - 3.74 uIU/mL   LIPID PANEL   Result Value Ref Range    Cholesterol, total 150 <200 MG/DL    Triglyceride 53 <150 MG/DL    HDL Cholesterol 60 MG/DL    LDL, calculated 79.4 0 - 100 MG/DL    VLDL, calculated 10.6 MG/DL    CHOL/HDL Ratio 2.5 0.0 - 5.0     HEMOGLOBIN A1C WITH EAG   Result Value Ref Range    Hemoglobin A1c 5.5 4.0 - 5.6 %    Est. average glucose 111 mg/dL       Immunizations administered during this encounter: There is no immunization history on file for this patient. Screening for Metabolic Disorders for Patients on Antipsychotic Medications  (Data obtained from the EMR)    Estimated Body Mass Index  Estimated body mass index is 22.51 kg/m² as calculated from the following:    Height as of this encounter: 6' (1.829 m). Weight as of this encounter: 75.3 kg (166 lb). Vital Signs/Blood Pressure  Visit Vitals  /69   Pulse 76   Temp 97.8 °F (36.6 °C)   Resp 16   Ht 6' (1.829 m)   Wt 75.3 kg (166 lb)   SpO2 97%   BMI 22.51 kg/m²       Blood Glucose/Hemoglobin A1c  Lab Results   Component Value Date/Time    Glucose 108 (H) 03/24/2022 11:49 AM       Lab Results   Component Value Date/Time    Hemoglobin A1c 5.5 03/29/2022 06:49 AM        Lipid Panel  Lab Results   Component Value Date/Time    Cholesterol, total 150 03/29/2022 06:49 AM    HDL Cholesterol 60 03/29/2022 06:49 AM    LDL, calculated 79.4 03/29/2022 06:49 AM    Triglyceride 53 03/29/2022 06:49 AM    CHOL/HDL Ratio 2.5 03/29/2022 06:49 AM        Discharge Diagnosis: Unspecified psychosis, rule out substance-induced psychosis, methamphetamine use disorder, severe. Discharge Plan: he was discharge in care of his parents       The patient Mansoor Jones exhibits the ability to control behavior in a less restrictive environment. Patient's level of functioning is improving. No assaultive/destructive behavior has been observed for the past 24 hours. No suicidal/homicidal threat or behavior has been observed for the past 24 hours. There is no evidence of serious medication side effects. Patient has not been in physical or protective restraints for at least the past 24 hours.     If weapons involved, how are they secured? none    Is patient aware of and in agreement with discharge plan? Yes    Arrangements for medication:  Prescriptions given to patient, given a weeks supply or 30 day supply. Copy of discharge instructions to provider?:  yes    Arrangements for transportation home:  Father will     Keep all follow up appointments as scheduled, continue to take prescribed medications per physician instructions. Mental health crisis number:  [phone] or your local mental health crisis line number at [phone]        Discharge Medication List and Instructions:   Discharge Medication List as of 3/30/2022 12:22 PM      START taking these medications    Details   perphenazine (TRILAFON) 4 mg tablet Take 1 Tablet by mouth two (2) times a day. Indications: schizophrenia, Normal, Disp-60 Tablet, R-0         STOP taking these medications       buprenorphine-naloxone (SUBOXONE) 8-2 mg film sublingaul film Comments:   Reason for Stopping:         mupirocin (BACTROBAN) 2 % ointment Comments:   Reason for Stopping:               Unresulted Labs (24h ago, onward)            None        To obtain results of studies pending at discharge, please contact [phone]    Follow-up Information     Follow up With Specialties Details Why Contact Info    Demetrius June MD Obstetrics & Gynecology, Gynecology, Obstetrics   [facility]  [address] [place] [address] to follow up for MAT [address] Marianna Bland  Go in 1 day Rapid access intake assessment M-F 8am-2pm [address] [place] [address] [phone]          Advanced Directive:   Does the patient have an appointed surrogate decision maker? No  Does the patient have a Medical Advance Directive? No  Does the patient have a Psychiatric Advance Directive?  No  If the patient does not have a surrogate or Medical Advance Directive AND Psychiatric Advance Directive, the patient was offered information on these advance directives Patient declined to complete    Patient Instructions: Please continue all medications until otherwise directed by physician. Tobacco Cessation Discharge Plan:   Is the patient a smoker and needs referral for smoking cessation? Yes  Patient referred to the following for smoking cessation with an appointment? Refused     Patient was offered medication to assist with smoking cessation at discharge? Refused  Was education for smoking cessation added to the discharge instructions? Yes    Alcohol/Substance Abuse Discharge Plan:   Does the patient have a history of substance/alcohol abuse and requires a referral for treatment? Yes  Patient referred to the following for substance/alcohol abuse treatment with an appointment? Yes - [facility] the [facility]   Patient was offered medication to assist with alcohol cessation at discharge? No  Was education for substance/alcohol abuse added to discharge instructions? No    Patient discharged to Home; discussed with patient/caregiver and provided to the patient/caregiver either in hard copy or electronically.

## 2022-03-30 NOTE — BH NOTES
GROUP THERAPY PROGRESS NOTE    Patient did not participate in self-care group.     Delio KNOX, [facility]

## 2022-03-30 NOTE — DISCHARGE SUMMARY
DISCHARGE SUMMARY    Some parts of the discharge summary are from the initial Psychiatric interview that was done on admission by the admitting psychiatrist.      Date of Admission: 3/24/2022    Date of Discharge:3/30/2022     TYPE OF DISCHARGE:   REGULAR -  YES    AMA - NO  RELEASED BY THE TDO COURT - NO    ADMISSION EVALUATION:  CHIEF COMPLAINT:  Shirlyn Primrose tricked me in to coming here. \"     HISTORY OF PRESENT ILLNESS:  The patient is a 78-year-old male who is currently admitted to the [facility] Unit at [facility] and was involuntarily committed to the hospital today. He was brought in on a TDO with a report from the family of him having been increasingly agitated and having bizarre behavior. He reports that for the past 3 days, he has been having thoughts of suicide and over the last 10 days, he has been hearing voices, which can get loud and quite intrusive at times. He had reported that when he hears the voices, he wants to end his life because they are so loud and insistent. He has been using methamphetamine for the past year and estimates that he was using about 25 dollars worth of methamphetamine at least 4 or 5 times a week. His last use was yesterday. He admits that the use of meth has been most likely the cause of his hallucinations and understands the relationship with this. States that he has not been going to work for the last 10 days, has been sleeping poorly and has had little appetite. He was mildly agitated during the interview, repeatedly saying under his breath that he had been tricked in to coming here and that he wanted to leave. However, he was redirectable and cooperative with most of the interview.   States that he feels paranoid and thinks that there are people out to get him.     PAST MEDICAL HISTORY:  Reviewed as per the history and physical exam.             Past Medical History:   Diagnosis Date    Crack cocaine use      Pneumomediastinum ([address]                Prior to Admission medications    Medication Sig Start Date End Date Taking? Authorizing Provider   mupirocin (BACTROBAN) 2 % ointment Apply  to affected area daily. Patient not taking: Reported on 3/26/2022 3/9/22     PREMA Lozano   famotidine (PEPCID) 20 mg tablet Take 1 Tab by mouth two (2) times a day. Patient not taking: Reported on 3/26/2022 6/6/18     Candelaria Wood MD             Vitals:     03/27/22 1259 03/27/22 1650 03/27/22 2023 03/28/22 0825   BP: 135/79 130/65 105/63 111/75   Pulse: 77 81 (!) 54 68   Resp: 16 16 16 16   Temp: 98 °F (36.7 °C) 97.8 °F (36.6 °C) 98 °F (36.7 °C) 97.8 °F (36.6 °C)   SpO2:   97% 98% 98%   Weight:           Height:                    Lab Results   Component Value Date/Time     WBC 8.0 03/24/2022 11:49 AM     HGB 14.5 03/24/2022 11:49 AM     HCT 43.6 03/24/2022 11:49 AM     PLATELET 983 74/15/8141 11:49 AM     MCV 89.7 03/24/2022 11:49 AM            Lab Results   Component Value Date/Time     Sodium 136 03/24/2022 11:49 AM     Potassium 3.9 03/24/2022 11:49 AM     Chloride 104 03/24/2022 11:49 AM     CO2 28 03/24/2022 11:49 AM     Anion gap 4 (L) 03/24/2022 11:49 AM     Glucose 108 (H) 03/24/2022 11:49 AM     BUN 15 03/24/2022 11:49 AM     Creatinine 0.87 03/24/2022 11:49 AM     BUN/Creatinine ratio 17 03/24/2022 11:49 AM     GFR est AA >60 03/24/2022 11:49 AM     GFR est non-AA >60 03/24/2022 11:49 AM     Calcium 9.0 03/24/2022 11:49 AM     Bilirubin, total 0.3 03/24/2022 11:49 AM     Alk.  phosphatase 69 03/24/2022 11:49 AM     Protein, total 7.2 03/24/2022 11:49 AM     Albumin 3.6 03/24/2022 11:49 AM     Globulin 3.6 03/24/2022 11:49 AM     A-G Ratio 1.0 (L) 03/24/2022 11:49 AM     ALT (SGPT) 18 03/24/2022 11:49 AM     AST (SGOT) 12 (L) 03/24/2022 11:49 AM      No results found for: VALF2, VALAC, VALP, VALPR, DS6, CRBAM, CRBAMP, CARB2, XCRBAM  No results found for: LITHM    RADIOLOGY REPORTS:(reviewed/updated 3/28/2022)  No results found.     PAST PSYCHIATRIC HISTORY: The patient denies any prior history of inpatient hospitalizations or suicide attempts. Denies any prior history of similar symptoms as today. States that he started using methamphetamine about a year ago, but has experimented with other drugs in the past.     PSYCHOSOCIAL HISTORY:  Currently, the patient lives in [place] [facility] [place] [facility] with both of his parents. 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 He has never been , does not have any children, and is not in a relationship at the present time. Denies any significant legal stressors at the present time.     MENTAL STATUS EXAM:  The patient is a young  male who is dressed in hospital apparel. He is poorly groomed and has not shaved or combed his hair in many days. He makes limited eye contact and was mildly agitated during the interview. He kept muttering under his breath. His speech is spontaneous and coherent, although decreased in output. Mood is reported as being upset and his affect is blunted. Auditory hallucinations are present as described above. Paranoid delusions of persecution and reference are also present. His thought process is mildly disorganized. Cognitively, he is awake and alert, oriented to time, place and person. Intelligence is average. Memory is intact and fund of knowledge is adequate. Insight is poor and judgment is poor.     ASSESSMENT AND PLAN/DIAGNOSIS:  Unspecified psychosis, rule out substance-induced psychosis, methamphetamine use disorder, severe.     At the present time, we will continue his inpatient stay. He will be provided with support and attend groups. Psychotropic medications will be adjusted as needed.   His strengths include stable housing and support from his family.     Course in the Hospital:     Patient was admitted to the inpatient psychiatry unit for acute psychiatric stabilization in regards to symptomatology as described in the HPI above and placed on Q15 minute checks and withdrawal precautions. While on the unit Nancy Swanson was involved in individual, group, occupational and milieu therapy. He was started back on his usual medication regimen as well as PRN medications including Risperidone, Trazodone for sleep. He improved gradually and was able to integrate into the milieu with help from the nursing staff. He declined to take Suboxone and this was stopped. Patients symptoms improved gradually including AH, poor sleep, disorganized behavior, agitation, poor personal grooming. He was quite on the unit, appropriate in his interactions, and cooperative with medications and the unit routine. Please see individual progress notes for more specific details regarding patient's hospitalization course. Patient was discharged as per the plan. He had been doing well on the unit as per the report of the nursing staff and my observations. No PRN medication for agitation, seclusion or restraints were required during the last 48 hours of his stay. Nancy Swanson had improved progressively to the point of being stable for discharge and outpatient FU. At this time he did not offer any complaints. Patient denied any SI or HI. Denied any AH or VH. He denied any delusions. Was not considered a danger to self or to others and is safe for discharge. Will FU with his appointments and remains motivated to be in treatment. The patient verbalized understanding of his discharge instructions. He was switched to Perphenazine at discharge due to restrictions on Risperidone at the post discharge rehab facility. DISCHARGE DIAGNOSIS:  Unspecified psychosis, rule out substance-induced psychosis, methamphetamine use disorder, severe. MENTAL STATUS EXAM ON DISCHARGE:    General appearance:   Nancy Swanson is a 27 y.o.  WHITE/NON- male who is well groomed, psychomotor activity is WNL  Eye contact: makes good eye contact  Speech: Spontaneous and coherent  Affect : Euthymic  Mood: \"OK\"  Thought Process: Logical, goal directed  Perception: Denies any AH or VH. Thought Content: Denies any SI or Plan  Insight: Partial  Judgement: Fair  Cognition: Intact grossly. Current Discharge Medication List      START taking these medications    Details   perphenazine (TRILAFON) 4 mg tablet Take 1 Tablet by mouth two (2) times a day. Indications: schizophrenia  Qty: 60 Tablet, Refills: 0  Start date: 3/30/2022         STOP taking these medications       buprenorphine-naloxone (SUBOXONE) 8-2 mg film sublingaul film Comments:   Reason for Stopping:         mupirocin (BACTROBAN) 2 % ointment Comments:   Reason for Stopping:              No results found for: VALF2, VALAC, VALP, VALPR, DS6, CRBAM, CRBAMP, CARB2, XCRBAM  No results found for: LITHM  Follow-up Information     Follow up With Specialties Details Why Contact Info    Nilson Townsend MD Obstetrics & Gynecology, Gynecology, Obstetrics   [facility]  [address]  [address] to follow up for MAT  Marianna Parish Bland  Go in 1 day Rapid access intake assessment M-F 8am-2pm [address] [facility]  [phone]        WOUND CARE: none needed. PROGNOSIS:   Good / Fair based on nature of patient's pathology/ies and treatment compliance issues. Prognosis is greatly dependent upon patient's ability to  follow up on psychiatric/psychotherapy appointments as well as to comply with psychiatric medications as prescribed.

## 2022-03-30 NOTE — PROGRESS NOTES
Problem: Altered Thought Process (Adult/Pediatric)  Goal: *STG: Participates in treatment plan  Outcome: Progressing Towards Goal  Note: Out on unit passively engaged. Mood and affect anxious related to upcoming discharged. Denies SI, demonstrates appropriate behaviors, ability to follow staff direction and unit routine. Daily goal is to discharged today.  Staff focus is on offering support and d/c education  Goal: *STG: Seeks staff when feelings of anxiety and fear arise  Outcome: Progressing Towards Goal  Goal: *STG: Demonstrates ability to understand and use improved judgment in daily activities and relationships  Outcome: Progressing Towards Goal  Goal: Interventions  Outcome: Progressing Towards Goal

## 2022-03-30 NOTE — PROGRESS NOTES
Pharmacist Discharge Medication Reconciliation    Discharging Provider: Dr. Marjorie Reagan PMH:   Past Medical History:   Diagnosis Date    Crack cocaine use     Pneumomediastinum [facility])      Chief Complaint for this Admission:   Chief Complaint   Patient presents with    Suicidal     Allergies: Patient has no known allergies. Discharge Medications:   Current Discharge Medication List        START taking these medications    Details   perphenazine (TRILAFON) 4 mg tablet Take 1 Tablet by mouth two (2) times a day.  Indications: schizophrenia  Qty: 61 Tablet, Refills: 0  Start date: 3/30/2022           STOP taking these medications       buprenorphine-naloxone (SUBOXONE) 8-2 mg film sublingaul film Comments:   Reason for Stopping:         mupirocin (BACTROBAN) 2 % ointment Comments:   Reason for Stopping:             The patient's chart, MAR and AVS were reviewed by Marci Arita, PHARMD.

## 2022-09-26 ENCOUNTER — HOSPITAL ENCOUNTER (EMERGENCY)
Age: 27
Discharge: HOME | End: 2022-09-26
Attending: EMERGENCY MEDICINE
Payer: COMMERCIAL

## 2022-09-26 ENCOUNTER — APPOINTMENT (OUTPATIENT)
Dept: CT IMAGING | Age: 27
End: 2022-09-26
Attending: STUDENT IN AN ORGANIZED HEALTH CARE EDUCATION/TRAINING PROGRAM
Payer: COMMERCIAL

## 2022-09-26 VITALS
RESPIRATION RATE: 20 BRPM | SYSTOLIC BLOOD PRESSURE: 158 MMHG | DIASTOLIC BLOOD PRESSURE: 94 MMHG | HEART RATE: 66 BPM | TEMPERATURE: 97.6 F | OXYGEN SATURATION: 94 %

## 2022-09-26 DIAGNOSIS — L03.211 FACIAL CELLULITIS: Primary | ICD-10-CM

## 2022-09-26 LAB
ALBUMIN SERPL-MCNC: 4 G/DL (ref 3.5–5)
ALBUMIN/GLOB SERPL: 1 {RATIO} (ref 1.1–2.2)
ALP SERPL-CCNC: 83 U/L (ref 45–117)
ALT SERPL-CCNC: 42 U/L (ref 12–78)
ANION GAP SERPL CALC-SCNC: 4 MMOL/L (ref 5–15)
AST SERPL-CCNC: 106 U/L (ref 15–37)
BASOPHILS # BLD: 0 K/UL (ref 0–0.1)
BASOPHILS NFR BLD: 0 % (ref 0–1)
BILIRUB SERPL-MCNC: 1.3 MG/DL (ref 0.2–1)
BUN SERPL-MCNC: 20 MG/DL (ref 6–20)
BUN/CREAT SERPL: 20 (ref 12–20)
CALCIUM SERPL-MCNC: 9.2 MG/DL (ref 8.5–10.1)
CHLORIDE SERPL-SCNC: 102 MMOL/L (ref 97–108)
CO2 SERPL-SCNC: 29 MMOL/L (ref 21–32)
COMMENT, HOLDF: NORMAL
CREAT SERPL-MCNC: 1 MG/DL (ref 0.7–1.3)
DIFFERENTIAL METHOD BLD: ABNORMAL
EOSINOPHIL # BLD: 0.4 K/UL (ref 0–0.4)
EOSINOPHIL NFR BLD: 4 % (ref 0–7)
ERYTHROCYTE [DISTWIDTH] IN BLOOD BY AUTOMATED COUNT: 14.3 % (ref 11.5–14.5)
GLOBULIN SER CALC-MCNC: 3.9 G/DL (ref 2–4)
GLUCOSE SERPL-MCNC: 114 MG/DL (ref 65–100)
HCT VFR BLD AUTO: 41.8 % (ref 36.6–50.3)
HGB BLD-MCNC: 14.3 G/DL (ref 12.1–17)
IMM GRANULOCYTES # BLD AUTO: 0 K/UL (ref 0–0.04)
IMM GRANULOCYTES NFR BLD AUTO: 0 % (ref 0–0.5)
LACTATE SERPL-SCNC: 0.9 MMOL/L (ref 0.4–2)
LYMPHOCYTES # BLD: 2.3 K/UL (ref 0.8–3.5)
LYMPHOCYTES NFR BLD: 26 % (ref 12–49)
MCH RBC QN AUTO: 29.4 PG (ref 26–34)
MCHC RBC AUTO-ENTMCNC: 34.2 G/DL (ref 30–36.5)
MCV RBC AUTO: 85.8 FL (ref 80–99)
MONOCYTES # BLD: 1.1 K/UL (ref 0–1)
MONOCYTES NFR BLD: 12 % (ref 5–13)
NEUTS SEG # BLD: 5.3 K/UL (ref 1.8–8)
NEUTS SEG NFR BLD: 58 % (ref 32–75)
NRBC # BLD: 0 K/UL (ref 0–0.01)
NRBC BLD-RTO: 0 PER 100 WBC
PLATELET # BLD AUTO: 227 K/UL (ref 150–400)
PMV BLD AUTO: 8.5 FL (ref 8.9–12.9)
POTASSIUM SERPL-SCNC: 3.5 MMOL/L (ref 3.5–5.1)
PROT SERPL-MCNC: 7.9 G/DL (ref 6.4–8.2)
RBC # BLD AUTO: 4.87 M/UL (ref 4.1–5.7)
SAMPLES BEING HELD,HOLD: NORMAL
SODIUM SERPL-SCNC: 135 MMOL/L (ref 136–145)
WBC # BLD AUTO: 9.2 K/UL (ref 4.1–11.1)

## 2022-09-26 PROCEDURE — 80053 COMPREHEN METABOLIC PANEL: CPT

## 2022-09-26 PROCEDURE — 99285 EMERGENCY DEPT VISIT HI MDM: CPT

## 2022-09-26 PROCEDURE — 36415 COLL VENOUS BLD VENIPUNCTURE: CPT

## 2022-09-26 PROCEDURE — 74011000636 HC RX REV CODE- 636: Performed by: EMERGENCY MEDICINE

## 2022-09-26 PROCEDURE — 85025 COMPLETE CBC W/AUTO DIFF WBC: CPT

## 2022-09-26 PROCEDURE — 83605 ASSAY OF LACTIC ACID: CPT

## 2022-09-26 PROCEDURE — 70491 CT SOFT TISSUE NECK W/DYE: CPT

## 2022-09-26 RX ORDER — DOXYCYCLINE HYCLATE 100 MG
100 TABLET ORAL 2 TIMES DAILY
Qty: 20 TABLET | Refills: 0 | Status: SHIPPED | OUTPATIENT
Start: 2022-09-26 | End: 2022-10-06

## 2022-09-26 RX ORDER — BACITRACIN 500 [USP'U]/G
OINTMENT TOPICAL 3 TIMES DAILY
Qty: 28 G | Refills: 0 | Status: SHIPPED | OUTPATIENT
Start: 2022-09-26 | End: 2022-10-06

## 2022-09-26 RX ADMIN — IOPAMIDOL 100 ML: 612 INJECTION, SOLUTION INTRAVENOUS at 14:44

## 2022-09-26 NOTE — ED TRIAGE NOTES
Pt c/o infection to his face, +swelling noted to chin and neck, +difficulty swallowing and breathing , no resp distress noted in triage, resp even and unlabored, no grunting noted, +drainage noted to chin

## 2022-09-26 NOTE — ED PROVIDER NOTES
75-year-old male with history of cocaine abuse and methamphetamine abuse presents to ED with concerns of infection and swelling to face. He first noticed the swelling to his face yesterday, located under his throat and associated with some trouble swallowing. He also notes mild associated pain and tightness He notes that this has happened before, typically meth- induced from skin picking while under the influence of the drug. He notes that he recently relapsed after 6 months sober, and believes that this is likely what caused this incident. He reports that typically this is resolved with antibiotics and topical medications. Denies any fevers, chills, N/V/D, cough, CP, SOB. Patient reports that he is not interested in discussing his drug use further today; he reports \"I am just concerned about this facial infections. \"    The history is provided by the patient. Skin Problem        Past Medical History:   Diagnosis Date    Crack cocaine use     Pneumomediastinum ([facility] [address]        No past surgical history on file. No family history on file. Social History     Socioeconomic History    Marital status: SINGLE     Spouse name: Not on file    Number of children: Not on file    Years of education: Not on file    Highest education level: Not on file   Occupational History    Not on file   Tobacco Use    Smoking status: Every Day    Smokeless tobacco: Never   Substance and Sexual Activity    Alcohol use: Yes    Drug use: Yes     Types: Cocaine    Sexual activity: Not on file   Other Topics Concern    Not on file   Social History Narrative    Not on file     Social Determinants of Health     Financial Resource Strain: Not on file   Food Insecurity: Not on file   Transportation Needs: Not on file   Physical Activity: Not on file   Stress: Not on file   Social Connections: Not on file   Intimate Partner Violence: Not on file   Housing Stability: Not on file         ALLERGIES: Patient has no known allergies.     Review of Systems   Constitutional:  Negative for fever. HENT:  Positive for facial swelling. Negative for congestion and sinus pain. Respiratory:  Negative for cough. Cardiovascular:  Negative for chest pain. Gastrointestinal:  Negative for nausea and vomiting. Genitourinary:  Negative for dysuria. Musculoskeletal:  Negative for myalgias. Neurological:  Negative for headaches. Hematological:  Negative for adenopathy. All other systems reviewed and are negative. Vitals:    09/26/22 1014   BP: (!) 158/94   Pulse: 66   Resp: 20   Temp: 97.6 °F (36.4 °C)   SpO2: 94%            Physical Exam  Vitals and nursing note reviewed. Constitutional:       Appearance: Normal appearance. HENT:      Head:      Jaw: Swelling present. Salivary Glands: Right salivary gland is diffusely enlarged. Left salivary gland is diffusely enlarged. Right Ear: Tympanic membrane, ear canal and external ear normal.      Left Ear: Tympanic membrane, ear canal and external ear normal.      Nose: No congestion. Mouth/Throat:      Dentition: Abnormal dentition. Tonsils: No tonsillar exudate or tonsillar abscesses. Eyes:      Extraocular Movements: Extraocular movements intact. Pupils: Pupils are equal, round, and reactive to light. Cardiovascular:      Rate and Rhythm: Normal rate and regular rhythm. Heart sounds: Normal heart sounds. Pulmonary:      Effort: Pulmonary effort is normal.      Breath sounds: Normal breath sounds. Abdominal:      Palpations: Abdomen is soft. Tenderness: There is no abdominal tenderness. Lymphadenopathy:      Cervical: No cervical adenopathy. Skin:     General: Skin is warm and dry. Neurological:      General: No focal deficit present. Mental Status: He is alert and oriented to person, place, and time.    Psychiatric:         Mood and Affect: Mood normal.         Behavior: Behavior normal.        MDM  Number of Diagnoses or Management Options  Facial cellulitis  Diagnosis management comments: 68-year-old male with history of cocaine abuse and methamphetamine abuse presents to ED with concerns of infection and swelling to face. Vital signs stable in triage and patient is afebrile. Physical exam notable for facial swelling and erythema especially in submandibular region. Labs unremarkable with no elevated white blood cell count at 9.2 and lactic acid at 0.9. CT of soft tissue of neck showed soft tissue edema anterior and lateral to the mandible compatible with cellulitis in appropriate clinical scenario with no identifiable abscess or other significant abnormality. Patient did not want anything for pain while in ED. Patient will be discharged with prescription for antibiotic as well as instructions for conservative care, follow-up and return precautions. Patient refused any consult or discussion about drug use while in ED.        Amount and/or Complexity of Data Reviewed  Clinical lab tests: reviewed  Tests in the radiology section of CPT®: reviewed           Procedures

## 2023-08-30 ENCOUNTER — OFFICE VISIT (OUTPATIENT)
Age: 28
End: 2023-08-30

## 2023-08-30 VITALS
RESPIRATION RATE: 16 BRPM | SYSTOLIC BLOOD PRESSURE: 133 MMHG | HEART RATE: 69 BPM | OXYGEN SATURATION: 95 % | WEIGHT: 196 LBS | DIASTOLIC BLOOD PRESSURE: 69 MMHG | HEIGHT: 71 IN | BODY MASS INDEX: 27.44 KG/M2 | TEMPERATURE: 97.7 F

## 2023-08-30 DIAGNOSIS — K04.7 TOOTH ABSCESS: ICD-10-CM

## 2023-08-30 DIAGNOSIS — K02.9 TEETH DECAYED: Primary | ICD-10-CM

## 2023-08-30 RX ORDER — CHLORHEXIDINE GLUCONATE 0.12 MG/ML
15 RINSE ORAL 2 TIMES DAILY
Qty: 420 ML | Refills: 0 | Status: SHIPPED | OUTPATIENT
Start: 2023-08-30 | End: 2023-09-13

## 2023-08-30 RX ORDER — CLINDAMYCIN HYDROCHLORIDE 300 MG/1
300 CAPSULE ORAL 4 TIMES DAILY
Qty: 40 CAPSULE | Refills: 0 | Status: SHIPPED | OUTPATIENT
Start: 2023-08-30 | End: 2023-09-09

## 2023-08-30 RX ORDER — TRAZODONE HYDROCHLORIDE 50 MG/1
50 TABLET ORAL
COMMUNITY
Start: 2023-07-25

## 2023-08-30 RX ORDER — BUPROPION HYDROCHLORIDE 200 MG/1
TABLET, EXTENDED RELEASE ORAL
COMMUNITY
Start: 2023-07-26

## 2023-08-30 ASSESSMENT — ENCOUNTER SYMPTOMS
SINUS PRESSURE: 0
FACIAL SWELLING: 1

## 2023-08-30 NOTE — PROGRESS NOTES
by mouth 4 times daily for 10 days, Disp-40 capsule, R-0Normal  -     chlorhexidine (PERIDEX) 0.12 % solution; Swish and spit 15 mLs 2 times daily for 14 days, Disp-420 mL, R-0Normal  2. Tooth abscess  -     clindamycin (CLEOCIN) 300 MG capsule; Take 1 capsule by mouth 4 times daily for 10 days, Disp-40 capsule, R-0Normal  -     chlorhexidine (PERIDEX) 0.12 % solution; Swish and spit 15 mLs 2 times daily for 14 days, Disp-420 mL, R-0Normal     Follow with dentist ASAP    No results found for any visits on 08/30/23. The patients condition was discussed with the patient and they understand. The patient is to follow up with primary care doctor. If signs and symptoms become worse the pt is to go to the ER. The patient is to take medications as prescribed.

## 2023-12-14 PROBLEM — F29 PSYCHOSIS, UNSPECIFIED PSYCHOSIS TYPE (HCC): Status: ACTIVE | Noted: 2023-12-14

## 2024-06-04 ENCOUNTER — HOSPITAL ENCOUNTER (EMERGENCY)
Facility: HOSPITAL | Age: 29
Discharge: HOME | End: 2024-06-04
Attending: EMERGENCY MEDICINE
Payer: COMMERCIAL

## 2024-06-04 VITALS
TEMPERATURE: 98.3 F | HEART RATE: 95 BPM | RESPIRATION RATE: 16 BRPM | DIASTOLIC BLOOD PRESSURE: 75 MMHG | BODY MASS INDEX: 24.35 KG/M2 | SYSTOLIC BLOOD PRESSURE: 127 MMHG | WEIGHT: 174.6 LBS | OXYGEN SATURATION: 95 %

## 2024-06-04 DIAGNOSIS — F20.9 SCHIZOPHRENIA, UNSPECIFIED TYPE (HCC): Primary | ICD-10-CM

## 2024-06-04 DIAGNOSIS — F19.90 DRUG USE: ICD-10-CM

## 2024-06-04 PROCEDURE — 99282 EMERGENCY DEPT VISIT SF MDM: CPT

## 2024-06-04 PROCEDURE — 90791 PSYCH DIAGNOSTIC EVALUATION: CPT

## 2024-06-04 ASSESSMENT — PAIN - FUNCTIONAL ASSESSMENT: PAIN_FUNCTIONAL_ASSESSMENT: NONE - DENIES PAIN

## 2024-06-04 NOTE — ED TRIAGE NOTES
Patient presents from home with complaints of auditory and visual hallucinations. Patient reports history of schizophrenia and that these hallucinations are getting worse. Denies SI and HI, reports he took meth last night

## 2024-06-05 NOTE — BSMART NOTE
Comprehensive Assessment Form Part 1      Section I - Disposition    Schizophrenia, per history  Methamphetamine abuse    Past Medical History:   Diagnosis Date    Crack cocaine use     Pneumomediastinum (HCC)    '  The Medical Doctor to Psychiatrist conference was not completed.  The Medical Doctor is in agreement with Psychiatrist disposition because patient is not seeking inpatient BHU admission.  The plan is to discharge with follow up with his psychiatrist.  The on-call Psychiatrist consulted was N/A.  The admitting Psychiatrist will be N/A.  The admitting Diagnosis is N/A.  The Payor source is [facility].     This writer reviewed the [place] Suicide Severity Rating Scale in nursing flowsheet and the risk level assigned is no risk. Based on this assessment, the risk of suicide is no risk and the plan is to discharge with follow up with his outpatient psychiatrist.    Section II - Integrated Summary  Summary:  Patient arrived to the ED via HPD with complaints of AH and meth use. This clinician met with Lavon face to face in the ED. He was appropriately dressed but displayed poor hygiene. He was oriented x4 and was cooperative during the assessment. He presented with an anxious and paranoid mood. When this clinician went to meet with him he was standing behind the door in ED room 17. He was easily redirected to sit in the chair for the assessment. Throughout the assessment, Lavon would be looking out the door because he felt like his family was out at the nurses station talking about him. He denies current SI/HI and denies a history of suicide attempts. He has a history of inpatient BHU admissions but is unsure when his last admission was.    Lavon has a history of schizophrenia and substance use. He receives outpatient treatment at [facility], where he sees a psychiatrist and is prescribed Suboxone. He states he saw his psychiatrist last week and was prescribed Remeron, Seroquel, and

## 2024-06-05 NOTE — ED PROVIDER NOTES
[facility] EMERGENCY DEP  EMERGENCY DEPARTMENT ENCOUNTER      Pt Name: Lavon Hanley  MRN: [id number]  Birthdate 1995  Date of evaluation: 6/4/2024  Provider: Shauna Brian PA-C    CHIEF COMPLAINT       Chief Complaint   Patient presents with    Mental Health Problem         HISTORY OF PRESENT ILLNESS    HPI  Patient is a 28 y.o. male who presents today with complaints of hallucinations, would like to talk to BSMART.  He states he always has hallucinations but they seem more realistic than usual.  Reports meth use yesterday.  No SI or HI.    Nursing Notes were reviewed.      REVIEW OF SYSTEMS       Review of Systems   Psychiatric/Behavioral:  Positive for hallucinations.        Except as noted above the remainder of the review of systems was reviewed and negative.       PAST MEDICAL HISTORY     Past Medical History:   Diagnosis Date    Crack cocaine use     Pneumomediastinum (HCC)          SURGICAL HISTORY     No past surgical history on file.      CURRENT MEDICATIONS       Discharge Medication List as of 6/4/2024  8:22 PM        CONTINUE these medications which have NOT CHANGED    Details   sertraline (ZOLOFT) 50 MG tablet Take 1 tablet by mouth dailyHistorical Med      perphenazine 4 MG tablet Take 1 tablet by mouth 2 times dailyHistorical Med               ALLERGIES     Patient has no known allergies.      FAMILY HISTORY     No family history on file.       SOCIAL HISTORY       Social History     Socioeconomic History    Marital status: Single   Tobacco Use    Smoking status: Every Day    Smokeless tobacco: Never   Substance and Sexual Activity    Alcohol use: Yes    Drug use: Yes     Types: Cocaine     Social Determinants of Health     Food Insecurity: No Food Insecurity (12/16/2023)    Hunger Vital Sign     Worried About Running Out of Food in the Last Year: Never true     Ran Out of Food in the Last Year: Never true   Transportation Needs: No Transportation Needs (12/16/2023)    PRAPARE - Transportation

## 2024-06-05 NOTE — BSMART NOTE
BSMART assessment completed, and suicide risk level noted to be no risk. Charge Nurse Rita and PA Shauna Brian notified. Concerns not observed. Patient does not need a 1:1 constant observer as he is no risk for suicide.

## 2024-06-07 ENCOUNTER — HOSPITAL ENCOUNTER (EMERGENCY)
Facility: HOSPITAL | Age: 29
Discharge: LEFT BEFORE DISCHARGE | End: 2024-06-07
Payer: COMMERCIAL

## 2024-06-07 VITALS
HEART RATE: 93 BPM | SYSTOLIC BLOOD PRESSURE: 118 MMHG | DIASTOLIC BLOOD PRESSURE: 78 MMHG | HEIGHT: 72 IN | OXYGEN SATURATION: 97 % | BODY MASS INDEX: 23.83 KG/M2 | TEMPERATURE: 98.2 F | WEIGHT: 175.93 LBS | RESPIRATION RATE: 16 BRPM

## 2024-06-07 PROCEDURE — 90791 PSYCH DIAGNOSTIC EVALUATION: CPT

## 2024-06-07 PROCEDURE — 4500000002 HC ER NO CHARGE

## 2024-06-07 ASSESSMENT — PAIN - FUNCTIONAL ASSESSMENT: PAIN_FUNCTIONAL_ASSESSMENT: 0-10

## 2024-06-07 ASSESSMENT — PAIN SCALES - GENERAL: PAINLEVEL_OUTOF10: 0

## 2024-06-07 NOTE — BSMART NOTE
months ago and relapsed on Methamphetamine with last use on 6/4/24. Lavon stated that he is on a monthly Suboxone injection which he is compliant with and recently completed a drug treatment program two weeks ago.  There were no reported concerns with suicidal or homicidal ideations and Lavon did not report any self-harm. Due to his drug use, Lavon did report disruptions with his sleep and eating routines but did not attribute that to his mental health concerns.   As the assessment come to a close this writer processed with Lavon and his father regarding treatment options. Lavon expressed that he did not want admission into the hospital but would be open to referral to the [facility] after some consideration.   Lavon did choose to leave the hospital AMA before attending medical provider could consult with him.       The patient has demonstrated mental capacity to provide informed consent.  The information is given by the patient, parent, and past medical records.  The Chief Complaint is Paranoid delusions .  The Precipitant Factors are non compliance of medications,substance use .  Previous Hospitalizations: Patient has a history   The patient has not previously been in restraints.  [facility] .    Lethality Assessment:    The potential for suicide noted by the following: not noted.  The potential for homicide is not noted.  The patient has not been a perpetrator of sexual or physical abuse.  There are not pending charges.  The patient is not felt to be at risk for self harm or harm to others.  The attending nurse was advised  That patient is not currently presenting as a risk to self or others  .    Section III - Psychosocial  The patient's overall mood and attitude presents as paranoid .  Feelings of helplessness and hopelessness are not observed.  Generalized anxiety was reported during the assessment.  Panic is not observed. Phobias are not  experience being described as n/a.  The patient is currently unemployed and speaks English as a primary language.  The patient has no communication impairments affecting communication. The patient's preference for learning can be described as: can read and write adequately.  The patient's hearing is normal.  The patient's vision is normal.      Alexis Pelletier LCSW

## 2024-06-07 NOTE — BSMART NOTE
BSMART assessment completed, and suicide risk level noted to be no risk . Charge Nurse Rita and Physician Dr. Ibarra notified. Concerns not observed.

## 2024-06-07 NOTE — ED TRIAGE NOTES
Pt ambulatory through triage with his father with c/o of auditory hallucinations. Pt has schizophrenia and is noncompliant with his medications. Pt was prescribed pirfenidone when he was diagnosed \"awhile ago\" and stopped taking it. Pt was then prescribed Seroquel and clonidine and has been noncompliant with those. Pt says this week has been \"off the wall\" and he reports that he can usually distinguish what is real and what is not but for the past week he hasn't been able to. He says he has \"a lot of confusion in my head\" and says it is overwhelming. He reports that he feels like his family is in danger as well as the people that are close to him. Pt denies suicidal ideation. When asked what his thoughts are telling him, he says \"a whole bunch of shit.\"

## 2024-06-11 ENCOUNTER — HOSPITAL ENCOUNTER (EMERGENCY)
Facility: HOSPITAL | Age: 29
Discharge: LEFT BEFORE DISCHARGE | End: 2024-06-11
Attending: EMERGENCY MEDICINE
Payer: COMMERCIAL

## 2024-06-11 VITALS
OXYGEN SATURATION: 97 % | HEART RATE: 129 BPM | RESPIRATION RATE: 20 BRPM | SYSTOLIC BLOOD PRESSURE: 130 MMHG | TEMPERATURE: 98.6 F | DIASTOLIC BLOOD PRESSURE: 107 MMHG

## 2024-06-11 DIAGNOSIS — F22 PARANOID DELUSION (HCC): Primary | ICD-10-CM

## 2024-06-11 LAB
ALBUMIN SERPL-MCNC: 4.1 G/DL (ref 3.5–5)
ALBUMIN/GLOB SERPL: 0.9 (ref 1.1–2.2)
ALP SERPL-CCNC: 90 U/L (ref 45–117)
ALT SERPL-CCNC: 23 U/L (ref 12–78)
ANION GAP SERPL CALC-SCNC: 4 MMOL/L (ref 5–15)
APAP SERPL-MCNC: <2 UG/ML (ref 10–30)
AST SERPL-CCNC: 26 U/L (ref 15–37)
BASOPHILS # BLD: 0 K/UL (ref 0–0.1)
BASOPHILS NFR BLD: 0 % (ref 0–1)
BILIRUB SERPL-MCNC: 0.4 MG/DL (ref 0.2–1)
BUN SERPL-MCNC: 17 MG/DL (ref 6–20)
BUN/CREAT SERPL: 16 (ref 12–20)
CHLORIDE SERPL-SCNC: 104 MMOL/L (ref 97–108)
CO2 SERPL-SCNC: 29 MMOL/L (ref 21–32)
COMMENT:: NORMAL
CREAT SERPL-MCNC: 1.08 MG/DL (ref 0.7–1.3)
DIFFERENTIAL METHOD BLD: ABNORMAL
EOSINOPHIL # BLD: 0.2 K/UL (ref 0–0.4)
EOSINOPHIL NFR BLD: 2 % (ref 0–7)
ERYTHROCYTE [DISTWIDTH] IN BLOOD BY AUTOMATED COUNT: 14.3 % (ref 11.5–14.5)
ETHANOL SERPL-MCNC: <10 MG/DL (ref 0–0.08)
GLOBULIN SER CALC-MCNC: 4.5 G/DL (ref 2–4)
GLUCOSE SERPL-MCNC: 125 MG/DL (ref 65–100)
HCT VFR BLD AUTO: 44.9 % (ref 36.6–50.3)
HGB BLD-MCNC: 14.9 G/DL (ref 12.1–17)
IMM GRANULOCYTES # BLD AUTO: 0 K/UL (ref 0–0.04)
IMM GRANULOCYTES NFR BLD AUTO: 0 % (ref 0–0.5)
LYMPHOCYTES # BLD: 3.9 K/UL (ref 0.8–3.5)
LYMPHOCYTES NFR BLD: 36 % (ref 12–49)
MCH RBC QN AUTO: 28.5 PG (ref 26–34)
MCHC RBC AUTO-ENTMCNC: 33.2 G/DL (ref 30–36.5)
MCV RBC AUTO: 86 FL (ref 80–99)
MONOCYTES # BLD: 0.6 K/UL (ref 0–1)
MONOCYTES NFR BLD: 5 % (ref 5–13)
NEUTS SEG # BLD: 6.1 K/UL (ref 1.8–8)
NEUTS SEG NFR BLD: 57 % (ref 32–75)
NRBC # BLD: 0 K/UL (ref 0–0.01)
NRBC BLD-RTO: 0 PER 100 WBC
PLATELET # BLD AUTO: 278 K/UL (ref 150–400)
POTASSIUM SERPL-SCNC: 3.6 MMOL/L (ref 3.5–5.1)
PROT SERPL-MCNC: 8.6 G/DL (ref 6.4–8.2)
RBC # BLD AUTO: 5.22 M/UL (ref 4.1–5.7)
SALICYLATES SERPL-MCNC: 1.9 MG/DL (ref 2.8–20)
SODIUM SERPL-SCNC: 137 MMOL/L (ref 136–145)
SPECIMEN HOLD: NORMAL
WBC # BLD AUTO: 10.9 K/UL (ref 4.1–11.1)

## 2024-06-11 PROCEDURE — 80053 COMPREHEN METABOLIC PANEL: CPT

## 2024-06-11 PROCEDURE — 99285 EMERGENCY DEPT VISIT HI MDM: CPT

## 2024-06-11 PROCEDURE — 82077 ASSAY SPEC XCP UR&BREATH IA: CPT

## 2024-06-11 PROCEDURE — 85025 COMPLETE CBC W/AUTO DIFF WBC: CPT

## 2024-06-11 PROCEDURE — 80179 DRUG ASSAY SALICYLATE: CPT

## 2024-06-11 PROCEDURE — 80143 DRUG ASSAY ACETAMINOPHEN: CPT

## 2024-06-11 PROCEDURE — 36415 COLL VENOUS BLD VENIPUNCTURE: CPT

## 2024-06-11 ASSESSMENT — PAIN SCALES - GENERAL: PAINLEVEL_OUTOF10: 0

## 2024-06-11 ASSESSMENT — PAIN - FUNCTIONAL ASSESSMENT: PAIN_FUNCTIONAL_ASSESSMENT: 0-10

## 2024-06-11 ASSESSMENT — ENCOUNTER SYMPTOMS
EYES NEGATIVE: 1
RESPIRATORY NEGATIVE: 1
GASTROINTESTINAL NEGATIVE: 1

## 2024-06-11 NOTE — ED PROVIDER NOTES
[facility] EMERGENCY DEP  EMERGENCY DEPARTMENT ENCOUNTER      Pt Name: Lavon Hanley  MRN: [id number]  Birthdate 1995  Date of evaluation: 6/11/2024  Provider: Leandro Barry MD    CHIEF COMPLAINT       Chief Complaint   Patient presents with    Mental Health Problem         HISTORY OF PRESENT ILLNESS   (Location/Symptom, Timing/Onset, Context/Setting, Quality, Duration, Modifying Factors, Severity)  Note limiting factors.   28-year-old male with PMHx of psychotic episodes and polysubstance use (methamphetamine, cocaine) presents to the emergency department by ambulance for evaluation of paranoid delusions.  Patient denies SI HI but does report auditory hallucinations.  He reports that he used meth yesterday.  He has no additional complaints at this time        The history is provided by the patient.         Review of External Medical Records:     Nursing Notes were reviewed.    REVIEW OF SYSTEMS    (2-9 systems for level 4, 10 or more for level 5)     Review of Systems   Constitutional: Negative.    HENT: Negative.     Eyes: Negative.    Respiratory: Negative.     Cardiovascular: Negative.    Gastrointestinal: Negative.    Genitourinary: Negative.    Musculoskeletal: Negative.    Skin: Negative.    Neurological: Negative.    Psychiatric/Behavioral:  The patient is nervous/anxious.        Except as noted above the remainder of the review of systems was reviewed and negative.       PAST MEDICAL HISTORY     Past Medical History:   Diagnosis Date    Crack cocaine use     Pneumomediastinum (HCC)          SURGICAL HISTORY     No past surgical history on file.      CURRENT MEDICATIONS       Previous Medications    PERPHENAZINE 4 MG TABLET    Take 1 tablet by mouth 2 times daily    SERTRALINE (ZOLOFT) 50 MG TABLET    Take 1 tablet by mouth daily       ALLERGIES     Patient has no known allergies.    FAMILY HISTORY     No family history on file.       SOCIAL HISTORY       Social History     Socioeconomic History    Marital

## 2024-06-11 NOTE — BSMART NOTE
BSMART assessment completed, and suicide risk level noted to be No Risk. Primary Nurse Michael Martinez and Charge Nurse Cheyenne and Physician Jhonny notified. Concerns observed by Pt being restless and paranoid . Pt refusing to stay in smith bed area  and standing in corner near supply closet reporting to be afraid. Pt shows evidence of him experiencing delusions  as evidence of him reporting someone being after him and coming to kill him. Pt report provided this writer with a pocket knife that was on his person. Pt dressed in personal clothing with book bag during this time requesting to be place in a room so he could have his back to the wall, due to paranoia. Writer requested Pt to be wanded and changed into green grown .       
Bsmart Progress Note    Writer informed of Pt eloping from Ed after being informed of safety concern by writer. Non-emergency police contacted during this time   
Bsmart Progress Note:    Writer went to process with Pt reguarding bed placement and found Pt sitting on floor under/behind bed reporting to be \"terrified that someone will get me\". Pt questioning of how long it would take him to get upstairs to U and how he wanted to leave but was to afraid due to paranoia. Writer did inform Pt that non-emergency police would be called if he were to leave the premises . Pt expressed still being in favor of voluntary admission. Pt appeared to be irritable and paranoid, reporting someone to still be after him therefore he felt the need to hide himself   
withdrawal symptoms: body aches and sleep disturbance.    Section VI - Living Arrangements  The patient Single.  The patient is homeless. The patient has no children.  The patient does plan to return home upon discharge.  The patient does have legal issues pending. The patient's source of income comes from employment.  Pentecostal and cultural practices have not been voiced at this time.    The patient's greatest support comes from no one and this person will not be involved with the treatment.    The patient has been in an event described as horrible or outside the realm of ordinary life experience either currently or in the past.  The patient has not been a victim of sexual/physical abuse.     Section VII - Other Areas of Clinical Concern  The highest grade achieved is not assessed with the overall quality of school experience being described as not assessed.  The patient is currently unemployed and speaks English as a primary language. The patient has no communication impairments affecting communication. The patient's preference for learning can be described as: can read and write adequately. The patient's hearing is normal.  The patient's vision is normal.      DARRELL Betts, Supervisee in Clinical Social Work, [facility]

## 2024-06-11 NOTE — ED NOTES
Patient went outside at this time to smoke cigarette. This nurse attempted to redirect patient back to room but patient continued to walk outside of ED lobby. This nurse followed patient to ensure he would not elope. This nurse explained to patient that facility is smoke free and he needed to remain inside to ensure safety and that proper care could be given. Patient stated \"I just feel real unsafe right now\". After smoking, patient returned to room. BSMART contacted and updated. Charge also aware.

## 2024-06-11 NOTE — ED TRIAGE NOTES
BIBA co schizophrenic episode. Denies SI/HI. Endorses auditory hallucinations telling him to get help.

## 2024-06-16 ENCOUNTER — HOSPITAL ENCOUNTER (EMERGENCY)
Facility: HOSPITAL | Age: 29
Discharge: HOME | End: 2024-06-16
Attending: STUDENT IN AN ORGANIZED HEALTH CARE EDUCATION/TRAINING PROGRAM
Payer: COMMERCIAL

## 2024-06-16 VITALS
DIASTOLIC BLOOD PRESSURE: 86 MMHG | HEART RATE: 88 BPM | SYSTOLIC BLOOD PRESSURE: 144 MMHG | OXYGEN SATURATION: 100 % | WEIGHT: 175 LBS | TEMPERATURE: 98.6 F | RESPIRATION RATE: 18 BRPM | HEIGHT: 72 IN | BODY MASS INDEX: 23.7 KG/M2

## 2024-06-16 DIAGNOSIS — F19.10 SUBSTANCE ABUSE (HCC): Primary | ICD-10-CM

## 2024-06-16 DIAGNOSIS — F48.9 MENTAL HEALTH PROBLEM: ICD-10-CM

## 2024-06-16 LAB
AMORPH CRY URNS QL MICRO: ABNORMAL
AMPHET UR QL SCN: POSITIVE
APPEARANCE UR: CLEAR
BACTERIA URNS QL MICRO: ABNORMAL /HPF
BARBITURATES UR QL SCN: NEGATIVE
BENZODIAZ UR QL: NEGATIVE
BILIRUB UR QL: NEGATIVE
CANNABINOIDS UR QL SCN: NEGATIVE
COCAINE UR QL SCN: NEGATIVE
COLOR UR: ABNORMAL
EPITH CASTS URNS QL MICRO: ABNORMAL /LPF
GLUCOSE UR STRIP.AUTO-MCNC: NEGATIVE MG/DL
HGB UR QL STRIP: ABNORMAL
KETONES UR QL STRIP.AUTO: 15 MG/DL
LEUKOCYTE ESTERASE UR QL STRIP.AUTO: NEGATIVE
Lab: ABNORMAL
METHADONE UR QL: NEGATIVE
MUCOUS THREADS URNS QL MICRO: ABNORMAL /LPF
NITRITE UR QL STRIP.AUTO: NEGATIVE
OPIATES UR QL: NEGATIVE
PCP UR QL: NEGATIVE
PH UR STRIP: 6 (ref 5–8)
PROT UR STRIP-MCNC: 30 MG/DL
RBC #/AREA URNS HPF: ABNORMAL /HPF (ref 0–5)
SP GR UR REFRACTOMETRY: 1.03 (ref 1–1.03)
UROBILINOGEN UR QL STRIP.AUTO: 0.2 EU/DL (ref 0.2–1)
WBC URNS QL MICRO: ABNORMAL /HPF (ref 0–4)

## 2024-06-16 PROCEDURE — 99283 EMERGENCY DEPT VISIT LOW MDM: CPT

## 2024-06-16 PROCEDURE — 81001 URINALYSIS AUTO W/SCOPE: CPT

## 2024-06-16 PROCEDURE — 80307 DRUG TEST PRSMV CHEM ANLYZR: CPT

## 2024-06-16 RX ORDER — DIPHENHYDRAMINE HCL 25 MG
50 CAPSULE ORAL
Status: DISCONTINUED | OUTPATIENT
Start: 2024-06-16 | End: 2024-06-16 | Stop reason: HOSPADM

## 2024-06-16 RX ORDER — MIDAZOLAM HYDROCHLORIDE 5 MG/5ML
5 INJECTION, SOLUTION INTRAMUSCULAR; INTRAVENOUS
Status: DISCONTINUED | OUTPATIENT
Start: 2024-06-16 | End: 2024-06-16

## 2024-06-16 RX ORDER — HALOPERIDOL 5 MG/ML
5 INJECTION INTRAMUSCULAR
Status: DISCONTINUED | OUTPATIENT
Start: 2024-06-16 | End: 2024-06-16 | Stop reason: HOSPADM

## 2024-06-16 RX ORDER — LORAZEPAM 1 MG/1
2 TABLET ORAL
Status: DISCONTINUED | OUTPATIENT
Start: 2024-06-16 | End: 2024-06-16 | Stop reason: HOSPADM

## 2024-06-16 RX ORDER — HALOPERIDOL 5 MG/ML
5 INJECTION INTRAMUSCULAR
Status: DISCONTINUED | OUTPATIENT
Start: 2024-06-16 | End: 2024-06-16

## 2024-06-16 RX ORDER — MIDAZOLAM HYDROCHLORIDE 5 MG/5ML
5 INJECTION, SOLUTION INTRAMUSCULAR; INTRAVENOUS
Status: DISCONTINUED | OUTPATIENT
Start: 2024-06-16 | End: 2024-06-16 | Stop reason: HOSPADM

## 2024-06-16 RX ORDER — HALOPERIDOL 5 MG/1
5 TABLET ORAL
Status: DISCONTINUED | OUTPATIENT
Start: 2024-06-16 | End: 2024-06-16 | Stop reason: HOSPADM

## 2024-06-16 RX ORDER — DIPHENHYDRAMINE HYDROCHLORIDE 50 MG/ML
50 INJECTION INTRAMUSCULAR; INTRAVENOUS
Status: DISCONTINUED | OUTPATIENT
Start: 2024-06-16 | End: 2024-06-16

## 2024-06-16 RX ORDER — DIPHENHYDRAMINE HYDROCHLORIDE 50 MG/ML
50 INJECTION INTRAMUSCULAR; INTRAVENOUS
Status: DISCONTINUED | OUTPATIENT
Start: 2024-06-16 | End: 2024-06-16 | Stop reason: HOSPADM

## 2024-06-16 ASSESSMENT — PAIN - FUNCTIONAL ASSESSMENT: PAIN_FUNCTIONAL_ASSESSMENT: NONE - DENIES PAIN

## 2024-06-16 NOTE — ED TRIAGE NOTES
[place] Emergency Room Nursing Note        Patient Name: Lavon Hanley      : 1995             MRN: [id number]      Chief Complaint:  Mental Health Problem      Admit Diagnosis: No admission diagnoses are documented for this encounter.      Admitting Provider: No admitting provider for patient encounter.      Surgery: * No surgery found *           Patient arrived to the ER ambulatory, Handcuffed accompanied by 2 [place] Officer d/t to an ECO because the patient was reported to be going into someone else's property. No SI, no HI. Pt discloses a Hx of Schizophrenia.         Lines:        Signed by: Jordan Glynn RN, SAWYER, BSN, CMSRN                                              2024 at 7:13 AM

## 2024-06-16 NOTE — ED NOTES
This RN asked patient is he would be willing to have labs drawn. Patient refused blood draw. Patient then educated on the options of PO medication vs IM medications. Patient reports he does not want medications. States \"I don't want no needles in me. I don't need no medications. I need to get stable on my own medications.\" Door remains open. Officer on scene. Sitter remains sitting. Patient remains agitated and speaking to himself.

## 2024-06-16 NOTE — ED NOTES
[facility] Nursing Note        Patient Name: Lavon Hanley      : 1995             MRN: [id number]      Chief Complaint:  Mental Health Problem      Admit Diagnosis: No admission diagnoses are documented for this encounter.      Admitting Provider: No admitting provider for patient encounter.      Surgery: * No surgery found *           Patient declined to have blood work drawn until after he speaks with OCTAVIO BLOOD informed.         Lines:        Signed by: Jordan Glynn RN, SAWYER, BSN, CMSRN                                              2024 at 7:24 AM

## 2024-06-16 NOTE — ED NOTES
UDS results faxed to [facility]. Spoke to [facility] who reported she is going to reach out to the [facility], and will call back.

## 2024-06-16 NOTE — ED NOTES
Patient spoke to [facility] (via ipad) about follow up with [facility] and resources. Reports father will be here in 45 minutes,

## 2024-06-16 NOTE — ED PROVIDER NOTES
(Non-Medical): No   Housing Stability: Patient Declined (12/16/2023)    Housing Stability Vital Sign     Unable to Pay for Housing in the Last Year: Patient declined     Number of Places Lived in the Last Year: 1     Unstable Housing in the Last Year: Patient declined       PHYSICAL EXAM    (up to 7 for level 4, 8 or more for level 5)     ED Triage Vitals [06/16/24 0709]   BP Temp Temp Source Pulse Respirations SpO2 Height Weight - Scale   (!) 144/86 98.6 °F (37 °C) Oral 88 18 100 % 1.829 m (6') 79.4 kg (175 lb)       Body mass index is 23.73 kg/m².    Physical Exam    See mdm    DIAGNOSTIC RESULTS     EKG: All EKG's are interpreted by the Emergency Department Physician who either signs or Co-signs this chart in the absence of a cardiologist.        RADIOLOGY:   Non-plain film images such as CT, Ultrasound and MRI are read by the radiologist.    Interpretation per the Radiologist below, if available at the time of this note:    No orders to display        LABS:  Labs Reviewed   URINALYSIS WITH MICROSCOPIC - Abnormal; Notable for the following components:       Result Value    Protein, UA 30 (*)     Ketones, Urine 15 (*)     Blood, Urine TRACE (*)     BACTERIA, URINE 1+ (*)     Mucus, UA 1+ (*)     Amorphous Crystal 1+ (*)     All other components within normal limits   URINE DRUG SCREEN - Abnormal; Notable for the following components:    Amphetamine, Urine Positive (*)     All other components within normal limits   COVID-19 & INFLUENZA COMBO   ACETAMINOPHEN LEVEL   COMPREHENSIVE METABOLIC PANEL   ETHANOL   CBC WITH AUTO DIFFERENTIAL   SALICYLATE LEVEL   TSH       All other labs were within normal range or not returned as of this dictation.        PROCEDURES:  Unless otherwise noted below, none     Procedures    See MDM for documentation of critical care time.       FINAL IMPRESSION      1. Substance abuse (HCC)    2. Mental health problem          DISPOSITION/PLAN   DISPOSITION Decision To Discharge 06/16/2024

## 2024-06-16 NOTE — ED NOTES
Patient provided with discharge instructions. The patient verbalized understanding of discharge instructions and all questions were answered. The patient has no further concerns at this time. The patient was encouraged to call or return to the ED for worsening issues or problems and was encouraged to schedule a follow up appointment for continuing care. The patient left the Emergency Department ambulatory, alert and oriented and in no acute distress. Patient's father is in waiting room awaiting patient.         Prescreening form was received via fax and given to Dr. Pratt. [facility] made aware Dr. Pratt is in possession of prescreening form for [facility].

## 2024-06-16 NOTE — ED NOTES
Amadeo young called on EMS line reporting two officers are enroute to ED to assist with patient at this time. Provider called [facility] to request [facility] to call back prior to medication administration. See MAR for details. Patient remains agitated in room at this time. Sitter in place.

## 2024-06-16 NOTE — ED NOTES
[facility] called back to inquire if evaluation is complete, so medication administration can take place. [facility] speaking with attending physician at this time. Officer remains at bedside with patient. Lab called and asked about urine drug screen. Order made to add-on. Lab reports they will run specimen now. Patient remains agitated and speaking to officer.

## 2024-06-16 NOTE — ED NOTES
Yen with [facility] is on IPAD evaluating patient at this time. Door remains open. Four police officers on scene at this time.

## 2024-08-03 ENCOUNTER — HOSPITAL ENCOUNTER (EMERGENCY)
Facility: HOSPITAL | Age: 29
Discharge: HOME | End: 2024-08-03
Attending: EMERGENCY MEDICINE
Payer: COMMERCIAL

## 2024-08-03 VITALS
SYSTOLIC BLOOD PRESSURE: 142 MMHG | DIASTOLIC BLOOD PRESSURE: 91 MMHG | HEIGHT: 71 IN | OXYGEN SATURATION: 100 % | WEIGHT: 153 LBS | RESPIRATION RATE: 18 BRPM | HEART RATE: 65 BPM | BODY MASS INDEX: 21.42 KG/M2 | TEMPERATURE: 98.4 F

## 2024-08-03 DIAGNOSIS — F15.10 METHAMPHETAMINE ABUSE (HCC): ICD-10-CM

## 2024-08-03 DIAGNOSIS — L01.00 IMPETIGO: Primary | ICD-10-CM

## 2024-08-03 PROCEDURE — 99283 EMERGENCY DEPT VISIT LOW MDM: CPT

## 2024-08-03 PROCEDURE — 6370000000 HC RX 637 (ALT 250 FOR IP): Performed by: EMERGENCY MEDICINE

## 2024-08-03 RX ORDER — DOXYCYCLINE HYCLATE 100 MG
100 TABLET ORAL ONCE
Status: COMPLETED | OUTPATIENT
Start: 2024-08-03 | End: 2024-08-03

## 2024-08-03 RX ORDER — DOXYCYCLINE HYCLATE 100 MG
100 TABLET ORAL 2 TIMES DAILY
Qty: 14 TABLET | Refills: 0 | Status: SHIPPED | OUTPATIENT
Start: 2024-08-03 | End: 2024-08-10

## 2024-08-03 RX ADMIN — DOXYCYCLINE HYCLATE 100 MG: 100 TABLET, COATED ORAL at 04:03

## 2024-08-03 ASSESSMENT — PAIN SCALES - GENERAL: PAINLEVEL_OUTOF10: 2

## 2024-08-03 NOTE — ED TRIAGE NOTES
Pt brought to ED by EMS. Pt reports using meth and scratching. Pt complains of sores on face and arms. Pt states he has schizophrenia and is hearing voices. Pt denies SI and HI.

## 2024-08-03 NOTE — ED PROVIDER NOTES
[facility] EMERGENCY DEPT  EMERGENCY DEPARTMENT ENCOUNTER       Pt Name: Lavon Hanley  MRN: [id number]  Birthdate 1995  Date of evaluation: 8/3/2024  Provider: John Paul Jimenez DO   PCP: None, None  Note Started: 3:54 AM EDT 8/3/24     CHIEF COMPLAINT       Chief Complaint   Patient presents with    Abrasion        HISTORY OF PRESENT ILLNESS: 1 or more elements      History From: Patient, History limited by: none     Lavon Hanley is a 28 y.o. male past medical history significant for methamphetamine abuse presenting the emergency department complaining of skin wounds, poor dentition.  Patient reports multiple wounds on his skin on his face arms and legs.  He states that he does not think it is related to picking.  He has had skin infections in the past and would like antibiotics.He does report chronic psychosis, no change.  He reports auditory hallucinations       Please See MDM for Additional Details of the HPI/PMH  Nursing Notes were all reviewed and agreed with or any disagreements were addressed in the HPI.     REVIEW OF SYSTEMS        Positives and Pertinent negatives as per HPI.    PAST HISTORY     Past Medical History:  Past Medical History:   Diagnosis Date    Crack cocaine use     Pneumomediastinum (HCC)        Past Surgical History:  History reviewed. No pertinent surgical history.    Family History:  History reviewed. No pertinent family history.    Social History:  Social History     Tobacco Use    Smoking status: Every Day    Smokeless tobacco: Never   Substance Use Topics    Alcohol use: Yes    Drug use: Yes     Types: Cocaine       Allergies:  No Known Allergies    CURRENT MEDICATIONS      Previous Medications    PERPHENAZINE 4 MG TABLET    Take 1 tablet by mouth 2 times daily    SERTRALINE (ZOLOFT) 50 MG TABLET    Take 1 tablet by mouth daily       SCREENINGS               No data recorded         PHYSICAL EXAM      ED Triage Vitals   Enc Vitals Group      BP       Pulse       Resp       Temp       Temp  src       SpO2       Weight       Height       Head Circumference       Peak Flow       Pain Score       Pain Loc       Pain Edu?       Excl. in GC?         Physical Exam  Vitals and nursing note reviewed.   Constitutional:       General: He is not in acute distress.     Appearance: Normal appearance. He is not ill-appearing.      Comments: Agitated, mildly restless   HENT:      Head: Normocephalic and atraumatic.      Nose: Nose normal.      Mouth/Throat:      Mouth: Mucous membranes are moist.      Comments: Very poor dentition, significant dental decay  Eyes:      General:         Right eye: No discharge.         Left eye: No discharge.      Conjunctiva/sclera: Conjunctivae normal.   Cardiovascular:      Rate and Rhythm: Normal rate and regular rhythm.      Heart sounds: No murmur heard.  Pulmonary:      Effort: Pulmonary effort is normal. No respiratory distress.      Breath sounds: Normal breath sounds. No stridor. No wheezing or rales.   Abdominal:      General: Abdomen is flat. Bowel sounds are normal. There is no distension.      Palpations: Abdomen is soft.      Tenderness: There is no abdominal tenderness. There is no guarding.      Hernia: No hernia is present.   Musculoskeletal:         General: No tenderness or deformity. Normal range of motion.      Cervical back: Neck supple. No rigidity.   Skin:     General: Skin is warm and dry.      Comments: Scaling and crusting rashes mostly on the face, few scabs on the forearms.  Face appears most consistent with impetigo and folliculitis.   Neurological:      General: No focal deficit present.      Mental Status: He is alert and oriented to person, place, and time.   Psychiatric:         Mood and Affect: Mood normal.         Behavior: Behavior normal.          DIAGNOSTIC RESULTS   LABS:     No results found for this or any previous visit (from the past 24 hour(s)).    EKG: If performed, independent interpretation documented below in the MDM section

## 2024-08-03 NOTE — ED NOTES
Pt presents ambulatory to ED complaining of sores on arms and face. Pt is alert and oriented x 4, RR even and unlabored, skin is warm and dry. Assesment completed and pt updated on plan of care.       Emergency Department Nursing Plan of Care       The Nursing Plan of Care is developed from the Nursing assessment and Emergency Department Attending provider initial evaluation.  The plan of care may be reviewed in the “ED Provider note”.    The Plan of Care was developed with the following considerations:   Patient / Family readiness to learn indicated by:verbalized understanding  Persons(s) to be included in education: patient  Barriers to Learning/Limitations:None    Signed     Hardy Loredo RN    8/3/2024   3:57 AM

## 2024-08-26 ENCOUNTER — HOSPITAL ENCOUNTER (EMERGENCY)
Facility: HOSPITAL | Age: 29
Discharge: LEFT BEFORE DISCHARGE | End: 2024-08-26
Attending: EMERGENCY MEDICINE
Payer: COMMERCIAL

## 2024-08-26 VITALS
HEART RATE: 82 BPM | TEMPERATURE: 97.7 F | DIASTOLIC BLOOD PRESSURE: 65 MMHG | OXYGEN SATURATION: 97 % | RESPIRATION RATE: 18 BRPM | SYSTOLIC BLOOD PRESSURE: 120 MMHG

## 2024-08-26 DIAGNOSIS — R42 DIZZINESS: Primary | ICD-10-CM

## 2024-08-26 LAB
EKG ATRIAL RATE: 69 BPM
EKG DIAGNOSIS: NORMAL
EKG P AXIS: 81 DEGREES
EKG P-R INTERVAL: 132 MS
EKG Q-T INTERVAL: 378 MS
EKG QRS DURATION: 92 MS
EKG QTC CALCULATION (BAZETT): 405 MS
EKG R AXIS: 76 DEGREES
EKG T AXIS: 53 DEGREES
EKG VENTRICULAR RATE: 69 BPM
GLUCOSE BLD STRIP.AUTO-MCNC: 164 MG/DL (ref 65–117)
SERVICE CMNT-IMP: ABNORMAL

## 2024-08-26 PROCEDURE — 4500000002 HC ER NO CHARGE

## 2024-08-26 PROCEDURE — 93005 ELECTROCARDIOGRAM TRACING: CPT | Performed by: EMERGENCY MEDICINE

## 2024-08-26 PROCEDURE — 93010 ELECTROCARDIOGRAM REPORT: CPT | Performed by: SPECIALIST

## 2024-08-26 PROCEDURE — 82962 GLUCOSE BLOOD TEST: CPT

## 2024-08-26 RX ORDER — CLONIDINE HYDROCHLORIDE 0.1 MG/1
0.1 TABLET ORAL
COMMUNITY
Start: 2024-07-16

## 2024-08-26 RX ORDER — MIRTAZAPINE 15 MG/1
15 TABLET, FILM COATED ORAL
COMMUNITY
Start: 2024-07-16

## 2024-08-26 RX ORDER — QUETIAPINE FUMARATE 100 MG/1
100 TABLET, FILM COATED ORAL
COMMUNITY
Start: 2024-06-11

## 2024-08-26 NOTE — ED NOTES
Went to evaluate patient but he was unable to be located in the ER.  Both RN, staff and security searched his purposes with no success but patient had could not be found.  I was not able to do medical evaluation.     Mary BethIvett PA  08/26/24 [id number]

## 2024-08-26 NOTE — ED NOTES
Provider went to assess patient. Patient was not found. Medic saw patient ambulate to the bathroom. Security notified. Patient assumed to elope at this time.

## 2024-08-26 NOTE — ED TRIAGE NOTES
Patient arrives with cc of dizziness. Patient arrives via EMS. Reports he was walking for about a hour when he began to feel dizzy and diaphoretic. Patient sat down and called ems. Patient reports once he was in the ambulance he began to feel better. Reports he has not ate today. Denies pain, chest pain, sob, new onset of tingling, numbness. Patient is A & Ox 4.  at this time.

## 2024-09-12 NOTE — INTERDISCIPLINARY ROUNDS
Behavioral Health Interdisciplinary Rounds     Patient Name: Nancy Swanson  Age: 27 y.o. Room/Bed:  731/  Primary Diagnosis: <principal problem not specified>   Admission Status: Voluntary Commitment     Readmission within 30 days: no  Power of  in place: no  Patient requires a blocked bed: no          Reason for blocked bed:     VTE Prophylaxis: No    Mobility needs/Fall risk: no  Flu Vaccine : no   Nutritional Plan: no  Consults:          Labs/Testing due today?: yes    Sleep hours: 8       Participation in Care/Groups:  yes  Medication Compliant?: Yes  PRNS (last 24 hours): None    Restraints (last 24 hours):  no     CIWA (range last 24 hours):     COWS (range last 24 hours):      Alcohol screening (AUDIT) completed -         If applicable, date SBIRT discussed in treatment team AND documented:   AUDIT Screen Score: Tobacco - patient is a smoker: Have You Used Tobacco in the Past 30 Days: Yes  Illegal Drugs use: Have You Used Any Illegal Substances Over the Past 12 Months: Yes    24 hour chart check complete: yes   ____________________________________________________________________________________________________________    Patient goal(s) for today:   Treatment team focus/goals: Plan for discharge on Wednesday.    Progress note : he slept well, denies SI ,     LOS:  5  Expected LOS: Wednesday     Financial concerns/prescription coverage:    Family contact:       Family requesting physician contact today:    Discharge plan: he will be discharged tomorrow   Access to weapons :  no       Outpatient provider(s):   Patient's preferred phone number for follow up call :   Patient's preferred e-mail address :  Participating treatment team members: Edinson Hernandez Dr. - [phone]

## 2025-04-07 ENCOUNTER — HOSPITAL ENCOUNTER (EMERGENCY)
Facility: HOSPITAL | Age: 30
Discharge: LEFT BEFORE DISCHARGE | End: 2025-04-08
Attending: STUDENT IN AN ORGANIZED HEALTH CARE EDUCATION/TRAINING PROGRAM
Payer: COMMERCIAL

## 2025-04-07 VITALS
DIASTOLIC BLOOD PRESSURE: 91 MMHG | BODY MASS INDEX: 23.64 KG/M2 | OXYGEN SATURATION: 100 % | WEIGHT: 168.87 LBS | HEART RATE: 89 BPM | SYSTOLIC BLOOD PRESSURE: 146 MMHG | RESPIRATION RATE: 18 BRPM | HEIGHT: 71 IN | TEMPERATURE: 98.2 F

## 2025-04-07 DIAGNOSIS — F15.10 METHAMPHETAMINE USE: ICD-10-CM

## 2025-04-07 DIAGNOSIS — F20.9 SCHIZOPHRENIA, UNSPECIFIED TYPE: Primary | ICD-10-CM

## 2025-04-07 LAB
ALBUMIN SERPL-MCNC: 4.1 G/DL (ref 3.5–5)
ALBUMIN/GLOB SERPL: 1 (ref 1.1–2.2)
ALP SERPL-CCNC: 85 U/L (ref 45–117)
ALT SERPL-CCNC: 20 U/L (ref 12–78)
AMPHET UR QL SCN: POSITIVE
ANION GAP SERPL CALC-SCNC: 4 MMOL/L (ref 2–12)
APAP SERPL-MCNC: <2 UG/ML (ref 10–30)
AST SERPL-CCNC: 31 U/L (ref 15–37)
BARBITURATES UR QL SCN: NEGATIVE
BASOPHILS # BLD: 0.04 K/UL (ref 0–0.1)
BASOPHILS NFR BLD: 0.5 % (ref 0–1)
BENZODIAZ UR QL: NEGATIVE
BILIRUB SERPL-MCNC: 0.5 MG/DL (ref 0.2–1)
BUN SERPL-MCNC: 18 MG/DL (ref 6–20)
BUN/CREAT SERPL: 19 (ref 12–20)
CALCIUM SERPL-MCNC: 9.5 MG/DL (ref 8.5–10.1)
CANNABINOIDS UR QL SCN: NEGATIVE
CHLORIDE SERPL-SCNC: 99 MMOL/L (ref 97–108)
CO2 SERPL-SCNC: 31 MMOL/L (ref 21–32)
COCAINE UR QL SCN: NEGATIVE
COMMENT:: NORMAL
CREAT SERPL-MCNC: 0.97 MG/DL (ref 0.7–1.3)
DIFFERENTIAL METHOD BLD: NORMAL
EOSINOPHIL # BLD: 0.16 K/UL (ref 0–0.4)
EOSINOPHIL NFR BLD: 2.1 % (ref 0–7)
ERYTHROCYTE [DISTWIDTH] IN BLOOD BY AUTOMATED COUNT: 13.8 % (ref 11.5–14.5)
ETHANOL SERPL-MCNC: <10 MG/DL (ref 0–0.08)
GLOBULIN SER CALC-MCNC: 4.3 G/DL (ref 2–4)
GLUCOSE SERPL-MCNC: 110 MG/DL (ref 65–100)
HCT VFR BLD AUTO: 39.2 % (ref 36.6–50.3)
HGB BLD-MCNC: 13.9 G/DL (ref 12.1–17)
IMM GRANULOCYTES # BLD AUTO: 0.02 K/UL (ref 0–0.04)
IMM GRANULOCYTES NFR BLD AUTO: 0.3 % (ref 0–0.5)
LYMPHOCYTES # BLD: 2.52 K/UL (ref 0.8–3.5)
LYMPHOCYTES NFR BLD: 32.4 % (ref 12–49)
Lab: ABNORMAL
MCH RBC QN AUTO: 30.9 PG (ref 26–34)
MCHC RBC AUTO-ENTMCNC: 35.5 G/DL (ref 30–36.5)
MCV RBC AUTO: 87.1 FL (ref 80–99)
METHADONE UR QL: NEGATIVE
MONOCYTES # BLD: 0.48 K/UL (ref 0–1)
MONOCYTES NFR BLD: 6.2 % (ref 5–13)
NEUTS SEG # BLD: 4.55 K/UL (ref 1.8–8)
NEUTS SEG NFR BLD: 58.5 % (ref 32–75)
NRBC # BLD: 0 K/UL (ref 0–0.01)
NRBC BLD-RTO: 0 PER 100 WBC
OPIATES UR QL: NEGATIVE
PCP UR QL: NEGATIVE
PLATELET # BLD AUTO: 308 K/UL (ref 150–400)
PMV BLD AUTO: 9.1 FL (ref 8.9–12.9)
POTASSIUM SERPL-SCNC: 4.1 MMOL/L (ref 3.5–5.1)
PROT SERPL-MCNC: 8.4 G/DL (ref 6.4–8.2)
RBC # BLD AUTO: 4.5 M/UL (ref 4.1–5.7)
SALICYLATES SERPL-MCNC: <1.7 MG/DL (ref 2.8–20)
SODIUM SERPL-SCNC: 134 MMOL/L (ref 136–145)
SPECIMEN HOLD: NORMAL
SPECIMEN HOLD: NORMAL
WBC # BLD AUTO: 7.8 K/UL (ref 4.1–11.1)

## 2025-04-07 PROCEDURE — 80143 DRUG ASSAY ACETAMINOPHEN: CPT

## 2025-04-07 PROCEDURE — 85025 COMPLETE CBC W/AUTO DIFF WBC: CPT

## 2025-04-07 PROCEDURE — 80053 COMPREHEN METABOLIC PANEL: CPT

## 2025-04-07 PROCEDURE — 80307 DRUG TEST PRSMV CHEM ANLYZR: CPT

## 2025-04-07 PROCEDURE — 99283 EMERGENCY DEPT VISIT LOW MDM: CPT

## 2025-04-07 PROCEDURE — 90791 PSYCH DIAGNOSTIC EVALUATION: CPT | Performed by: SOCIAL WORKER

## 2025-04-07 PROCEDURE — 82077 ASSAY SPEC XCP UR&BREATH IA: CPT

## 2025-04-07 PROCEDURE — 80179 DRUG ASSAY SALICYLATE: CPT

## 2025-04-07 ASSESSMENT — ENCOUNTER SYMPTOMS: SHORTNESS OF BREATH: 0

## 2025-04-07 ASSESSMENT — PAIN SCALES - GENERAL: PAINLEVEL_OUTOF10: 0

## 2025-04-07 ASSESSMENT — PAIN - FUNCTIONAL ASSESSMENT: PAIN_FUNCTIONAL_ASSESSMENT: 0-10

## 2025-04-07 NOTE — [facility] NOTE
[facility] progress note    Writer received shift brief from previous [facility] Clinician /Lesley who confirmed that Pt has already been seen by [facility] ,assessed by tele-psych, and made aware to access that bed is needed.

## 2025-04-07 NOTE — ED TRIAGE NOTES
Pt arrives ambulatory to the ED stating \"he wants to go upstairs\", stating he has a history of schizophrenia and is dealing with \"terrible anxiety and depression.\" Pt states he has AH but that they are not command in nature, denies VH. Denies SI/HI. Pt states he wants to be evaluated and put on a good medication. Pt states he uses meth with last use being early this morning.     Dr. Marion assessing pt in triage.    
drug screen positive for methamphetamines, otherwise ethanol not elevated.  Was seen by telehealth psych who recommends voluntary placement.  Will contact [facility] for for the placement. [WG]   2141 Patient has eloped from the emergency department [WG]      ED Course User Index  [WG] Adonis Marion DO           CONSULTS:  IP CONSULT TO TELE-PSYCH (SOCIAL WORK ONLY)  IP CONSULT TO [facility]    PROCEDURES:  Unless otherwise noted below, none     Procedures      FINAL IMPRESSION      1. Schizophrenia, unspecified type    2. Methamphetamine use          DISPOSITION/PLAN   DISPOSITION Eloped - Left Before Treatment Complete 04/07/2025 09:40:55 PM      PATIENT REFERRED TO:  No follow-up provider specified.    DISCHARGE MEDICATIONS:  New Prescriptions    No medications on file         (Please note that portions of this note were completed with a voice recognition program.  Efforts were made to edit the dictations but occasionally words are mis-transcribed.)    Adonis Marion DO (electronically signed)  Emergency Attending Physician / Physician Assistant / Nurse Practitioner

## 2025-04-08 NOTE — [facility] NOTE
[facility] progress note:    Writer made aware by Charge SUSY Salinas of Pt leaving ED AMA     Per chart review of Tele-psych /Ange Breaux LCSW , Pt flagged as low risk and was not recommended to have Pt prescreened if he became involuntary .